# Patient Record
Sex: FEMALE | Race: BLACK OR AFRICAN AMERICAN | Employment: FULL TIME | URBAN - METROPOLITAN AREA
[De-identification: names, ages, dates, MRNs, and addresses within clinical notes are randomized per-mention and may not be internally consistent; named-entity substitution may affect disease eponyms.]

---

## 2018-05-01 ENCOUNTER — OFFICE VISIT (OUTPATIENT)
Dept: FAMILY MEDICINE CLINIC | Age: 63
End: 2018-05-01
Payer: COMMERCIAL

## 2018-05-01 VITALS
BODY MASS INDEX: 32.03 KG/M2 | SYSTOLIC BLOOD PRESSURE: 122 MMHG | HEIGHT: 64 IN | HEART RATE: 89 BPM | DIASTOLIC BLOOD PRESSURE: 79 MMHG | WEIGHT: 187.6 LBS

## 2018-05-01 DIAGNOSIS — Z11.4 SCREENING FOR HIV WITHOUT PRESENCE OF RISK FACTORS: ICD-10-CM

## 2018-05-01 DIAGNOSIS — R07.89 CHEST PRESSURE: ICD-10-CM

## 2018-05-01 DIAGNOSIS — Z13.220 SCREENING FOR HYPERLIPIDEMIA: ICD-10-CM

## 2018-05-01 DIAGNOSIS — Z11.59 NEED FOR HEPATITIS C SCREENING TEST: ICD-10-CM

## 2018-05-01 DIAGNOSIS — Z72.0 TOBACCO ABUSE: ICD-10-CM

## 2018-05-01 DIAGNOSIS — E66.09 CLASS 1 OBESITY DUE TO EXCESS CALORIES WITHOUT SERIOUS COMORBIDITY WITH BODY MASS INDEX (BMI) OF 32.0 TO 32.9 IN ADULT: ICD-10-CM

## 2018-05-01 DIAGNOSIS — Z00.00 PHYSICAL EXAM: Primary | ICD-10-CM

## 2018-05-01 DIAGNOSIS — Z13.1 ENCOUNTER FOR SCREENING FOR DIABETES MELLITUS: ICD-10-CM

## 2018-05-01 PROCEDURE — 99386 PREV VISIT NEW AGE 40-64: CPT | Performed by: PHYSICIAN ASSISTANT

## 2018-05-01 ASSESSMENT — PATIENT HEALTH QUESTIONNAIRE - PHQ9
SUM OF ALL RESPONSES TO PHQ QUESTIONS 1-9: 0
1. LITTLE INTEREST OR PLEASURE IN DOING THINGS: 0
SUM OF ALL RESPONSES TO PHQ9 QUESTIONS 1 & 2: 0
2. FEELING DOWN, DEPRESSED OR HOPELESS: 0

## 2018-05-01 ASSESSMENT — ENCOUNTER SYMPTOMS
SHORTNESS OF BREATH: 0
CONSTIPATION: 1
NAUSEA: 0
COUGH: 0
VOMITING: 0

## 2018-05-11 ENCOUNTER — TELEPHONE (OUTPATIENT)
Dept: FAMILY MEDICINE CLINIC | Age: 63
End: 2018-05-11

## 2018-05-22 ENCOUNTER — HOSPITAL ENCOUNTER (OUTPATIENT)
Age: 63
Discharge: HOME | End: 2018-05-22
Payer: COMMERCIAL

## 2018-05-22 LAB
EKG ATRIAL RATE: 89 BPM
EKG P AXIS: 51 DEGREES
EKG P-R INTERVAL: 172 MS
EKG Q-T INTERVAL: 380 MS
EKG QRS DURATION: 82 MS
EKG QTC CALCULATION (BAZETT): 462 MS
EKG R AXIS: 25 DEGREES
EKG T AXIS: 30 DEGREES
EKG VENTRICULAR RATE: 89 BPM

## 2018-05-22 PROCEDURE — 93005 ELECTROCARDIOGRAM TRACING: CPT

## 2018-06-18 ENCOUNTER — TELEPHONE (OUTPATIENT)
Dept: FAMILY MEDICINE CLINIC | Age: 63
End: 2018-06-18

## 2020-01-22 ENCOUNTER — NURSE TRIAGE (OUTPATIENT)
Dept: OTHER | Facility: CLINIC | Age: 65
End: 2020-01-22

## 2020-02-03 ENCOUNTER — OFFICE VISIT (OUTPATIENT)
Dept: PRIMARY CARE CLINIC | Age: 65
End: 2020-02-03
Payer: COMMERCIAL

## 2020-02-03 VITALS
BODY MASS INDEX: 35.17 KG/M2 | HEART RATE: 74 BPM | WEIGHT: 206 LBS | DIASTOLIC BLOOD PRESSURE: 94 MMHG | SYSTOLIC BLOOD PRESSURE: 139 MMHG | OXYGEN SATURATION: 97 % | TEMPERATURE: 99.1 F | HEIGHT: 64 IN

## 2020-02-03 PROCEDURE — 4004F PT TOBACCO SCREEN RCVD TLK: CPT | Performed by: PHYSICIAN ASSISTANT

## 2020-02-03 PROCEDURE — 99214 OFFICE O/P EST MOD 30 MIN: CPT | Performed by: PHYSICIAN ASSISTANT

## 2020-02-03 PROCEDURE — 3017F COLORECTAL CA SCREEN DOC REV: CPT | Performed by: PHYSICIAN ASSISTANT

## 2020-02-03 PROCEDURE — G8427 DOCREV CUR MEDS BY ELIG CLIN: HCPCS | Performed by: PHYSICIAN ASSISTANT

## 2020-02-03 PROCEDURE — G8417 CALC BMI ABV UP PARAM F/U: HCPCS | Performed by: PHYSICIAN ASSISTANT

## 2020-02-03 PROCEDURE — G8484 FLU IMMUNIZE NO ADMIN: HCPCS | Performed by: PHYSICIAN ASSISTANT

## 2020-02-03 RX ORDER — DICYCLOMINE HYDROCHLORIDE 10 MG/1
10 CAPSULE ORAL 2 TIMES DAILY
Qty: 14 CAPSULE | Refills: 0 | Status: SHIPPED | OUTPATIENT
Start: 2020-02-03 | End: 2020-02-25

## 2020-02-03 ASSESSMENT — PATIENT HEALTH QUESTIONNAIRE - PHQ9
SUM OF ALL RESPONSES TO PHQ QUESTIONS 1-9: 0
SUM OF ALL RESPONSES TO PHQ9 QUESTIONS 1 & 2: 0
1. LITTLE INTEREST OR PLEASURE IN DOING THINGS: 0
2. FEELING DOWN, DEPRESSED OR HOPELESS: 0
SUM OF ALL RESPONSES TO PHQ QUESTIONS 1-9: 0

## 2020-02-03 ASSESSMENT — ENCOUNTER SYMPTOMS
CONSTIPATION: 1
ABDOMINAL PAIN: 1

## 2020-02-03 NOTE — PROGRESS NOTES
[address] [facility] CARE  [facility]  [address] [place] [address]  Dept: [phone]  Dept Fax: [phone]    Office Progress/Follow Up Note    Date of patient's visit: 2/3/2020    Patient's Name:  Adama Wagoner YOB: 1955            Patient Care Team:  Kieran Hutchison PA-C as PCP - General (Physician Assistant)  Kieran Hutchison PA-C as PCP - [facility] Empaneled Provider  ================================================================    REASON FOR VISIT/CHIEF COMPLAINT:  Establish Care and Abdominal Pain (Lower abdomen. Onset was years ago, pain is described as intermittent and becoming more frequently. Blood when wiping after having a BM)    HISTORY OF PRESENTING ILLNESS:  History was obtained from: patient. Adama Wagoner is a 60 y.o. is here to for follow up for abdominal pain. Patient states abdominal pain occurring \" more frequently and lower abdomen\", patient has voiced \"blood on toilet paper\". Patient voiced some abdominal straining with bowel movements due to constipation. Patient denies any colon cancer screening. Discussed abdominal pain, constipation, medications in depth with patient, informed patient she will be referred to GI for further evaluation abdominal pain and colon cancer screening, informed patient she will be prescribed stool softener and Bentyl to take for the next 7 days, instructed patient to contact PCP office to update on symptoms, patient voiced understanding. Patient denies any chronic past medical history. Pt blood pressure is elevated in office. Discussed elevated blood pressure and risk in depth with patient. Patient is obese. Discussed weight loss in depth with patient, encourage patient make changes in diet and the importance of physical activity by exercising multiple times weekly. Patient is a smoker, 1/2 pack daily.  Discussed tobacco cessation and risk in depth with patient, encourage patient to decrease and quit. Labs reviewed, inform patient labs will be ordered and have completed before follow-up appointment, patient voiced understanding. Health maintenance reviewed, patient voiced she had her cervical cancer screening via Pap smear by gynecology in the last 3 years, patient completed CAROLANN and will obtain records, discussed breast cancer screening in depth with patient, mammogram ordered. Medications reviewed, prescribed Colace 50 mg twice daily PRN, Bentyl 10 mg twice daily for 7 days. HPI    Patient Active Problem List   Diagnosis    Class 2 obesity due to excess calories with body mass index (BMI) of 35.0 to 35.9 in adult    Tobacco abuse    Tobacco abuse counseling       Health Maintenance Due   Topic Date Due    Hepatitis C screen  1955    Pneumococcal 0-64 years Vaccine (1 of 1 - PPSV23) 05/29/1963    DTaP/Tdap/Td vaccine (1 - Tdap) 05/29/1968    Cervical cancer screen  05/29/1978    Lipid screen  05/29/1997    Diabetes screen  05/29/1997    Shingles Vaccine (1 of 2) 05/29/2007    Colon cancer screen colonoscopy  05/29/2007    Breast cancer screen  07/21/2018       Allergies   Allergen Reactions    Fish-Derived Products          Current Outpatient Medications   Medication Sig Dispense Refill    docusate sodium (COLACE) 50 MG capsule Take 1 capsule by mouth 2 times daily as needed for Constipation 60 capsule 1    dicyclomine (BENTYL) 10 MG capsule Take 1 capsule by mouth 2 times daily for 7 days 14 capsule 0     No current facility-administered medications for this visit.         Social History     Tobacco Use    Smoking status: Current Every Day Smoker     Packs/day: 0.50     Years: 36.00     Pack years: 18.00     Types: Cigarettes    Smokeless tobacco: Never Used   Substance Use Topics    Alcohol use: Yes     Comment: social    Drug use: No       Family History   Problem Relation Age of Onset    Heart Failure Mother     No Known Problems Father    Bedelia Fruits Examination of the left-upper field reveals decreased breath sounds. Decreased breath sounds present. Abdominal:      Palpations: Abdomen is soft. There is no mass. Tenderness: There is no abdominal tenderness. Skin:     General: Skin is warm and dry. Neurological:      Mental Status: She is alert and oriented to person, place, and time. Psychiatric:         Speech: Speech normal.         Behavior: Behavior normal. Behavior is cooperative. Thought Content: Thought content normal.         Judgment: Judgment normal.           DIAGNOSTIC FINDINGS:  CBC:No results found for: WBC, HGB, PLT    BMP:  No results found for: NA, K, CL, CO2, BUN, CREATININE, GLUCOSE    HEMOGLOBIN A1C: No results found for: LABA1C    FASTING LIPID PANEL:No results found for: CHOL, HDL, TRIG    ASSESSMENT AND PLAN:  Judy Juan was seen today for establish care and abdominal pain. Diagnoses and all orders for this visit:    Lower abdominal pain  -     [facility]  [facility]  -     dicyclomine (BENTYL) 10 MG capsule; Take 1 capsule by mouth 2 times daily for 7 days    Constipation, unspecified constipation type  -     docusate sodium (COLACE) 50 MG capsule; Take 1 capsule by mouth 2 times daily as needed for Constipation  -     [facility], [address]    Elevated blood pressure reading    Class 2 obesity due to excess calories with body mass index (BMI) of 35.0 to 35.9 in adult, unspecified whether serious comorbidity present    Tobacco abuse    Tobacco abuse counseling    Healthcare maintenance  -     CBC Auto Differential; Future  -     Comprehensive Metabolic Panel; Future  -     Lipid Panel; Future    Encounter for screening mammogram for breast cancer  -     [facility] Digital Screen Bilateral [[id number]]; Future      FOLLOW UP AND INSTRUCTIONS:  Return in about 3 months (around 5/3/2020) for Physical, Lab Review, GI problem.     · Ana received counseling on the following healthy

## 2020-02-03 NOTE — PROGRESS NOTES
Visit Information    Have you changed or started any medications since your last visit including any over-the-counter medicines, vitamins, or herbal medicines? no   Are you having any side effects from any of your medications? -  no  Have you stopped taking any of your medications? Is so, why? -  no    Have you seen any other physician or provider since your last visit? Yes, Dermatologist for rash on chest about 2 weeks ago. Have you had any other diagnostic tests since your last visit? No  Have you been seen in the emergency room and/or had an admission to a hospital since we last saw you? No  Have you had your routine dental cleaning in the past 6 months? yes    Have you activated your [employer] account? If not, what are your barriers?  No: Pending     Patient Care Team:  Jolene Lewis PA-C as PCP - General (Physician Assistant)  Jolene Lewis PA-C as PCP - [facility] Provider    Medical History Review  Past Medical, Family, and Social History reviewed and does contribute to the patient presenting condition    Health Maintenance   Topic Date Due    Hepatitis C screen  1955    Pneumococcal 0-64 years Vaccine (1 of 1 - PPSV23) 05/29/1963    DTaP/Tdap/Td vaccine (1 - Tdap) 05/29/1968    HIV screen  05/29/1972    Cervical cancer screen  05/29/1978    Lipid screen  05/29/1997    Diabetes screen  05/29/1997    Shingles Vaccine (1 of 2) 05/29/2007    Colon cancer screen colonoscopy  05/29/2007    Breast cancer screen  07/21/2019    Flu vaccine (1) 09/01/2019

## 2020-02-04 ENCOUNTER — TELEPHONE (OUTPATIENT)
Dept: GASTROENTEROLOGY | Age: 65
End: 2020-02-04

## 2020-02-11 PROBLEM — Z71.6 TOBACCO ABUSE COUNSELING: Status: ACTIVE | Noted: 2020-02-11

## 2020-02-11 ASSESSMENT — ENCOUNTER SYMPTOMS
WHEEZING: 0
NAUSEA: 0
COUGH: 0
BACK PAIN: 0
SHORTNESS OF BREATH: 0
VOMITING: 0
RHINORRHEA: 0
SORE THROAT: 0
DIARRHEA: 0

## 2020-02-21 NOTE — PROGRESS NOTES
reviewed and does contribute to the patient presenting condition. patient\"s PMH/PSH,SH,PSYCH hx, MEDs, ALLERGIES, and ROS was all reviewed and updated ion the appropriate sections    Objective:   Physical Exam  Vitals signs and nursing note reviewed. Constitutional:       Appearance: Normal appearance. She is overweight. HENT:      Head: Normocephalic and atraumatic. Eyes:      Conjunctiva/sclera: Conjunctivae normal.      Pupils: Pupils are equal, round, and reactive to light. Neck:      Musculoskeletal: Normal range of motion and neck supple. Cardiovascular:      Rate and Rhythm: Normal rate and regular rhythm. Heart sounds: Normal heart sounds. Pulmonary:      Effort: Pulmonary effort is normal.      Breath sounds: Normal breath sounds. Abdominal:      General: Bowel sounds are normal. There is no distension. Palpations: Abdomen is soft. Tenderness: There is no abdominal tenderness. There is no guarding. Musculoskeletal: Normal range of motion. Skin:     General: Skin is warm and dry. Neurological:      General: No focal deficit present. Mental Status: She is oriented to person, place, and time. Psychiatric:         Mood and Affect: Mood normal.         Behavior: Behavior normal.         Thought Content: Thought content normal.         Judgment: Judgment normal.         Assessment:       Diagnosis Orders   1. Screen for colon cancer  COLONOSCOPY W/ OR W/O BIOPSY           Plan:      Presently abdominal pain appears to be vague. She has not had repeat pain since early February. Has never had colonoscopy screening.       The Endoscopic procedure was explained to the patient in detail  The prep and NPO were explained  All the Risks, Benefits, and Alternatives were explained  Risk of Bleeding, Perforation and Cardio Respiratory risks were explained  her questions were answered  The procedure has been scheduled with the  in the office  Patient was asked to give

## 2020-02-25 ENCOUNTER — OFFICE VISIT (OUTPATIENT)
Dept: GASTROENTEROLOGY | Age: 65
End: 2020-02-25
Payer: COMMERCIAL

## 2020-02-25 VITALS
OXYGEN SATURATION: 97 % | SYSTOLIC BLOOD PRESSURE: 141 MMHG | BODY MASS INDEX: 35.02 KG/M2 | WEIGHT: 204 LBS | HEART RATE: 92 BPM | DIASTOLIC BLOOD PRESSURE: 94 MMHG

## 2020-02-25 PROCEDURE — 99244 OFF/OP CNSLTJ NEW/EST MOD 40: CPT | Performed by: INTERNAL MEDICINE

## 2020-02-25 PROCEDURE — G8427 DOCREV CUR MEDS BY ELIG CLIN: HCPCS | Performed by: INTERNAL MEDICINE

## 2020-02-25 PROCEDURE — G8417 CALC BMI ABV UP PARAM F/U: HCPCS | Performed by: INTERNAL MEDICINE

## 2020-02-25 PROCEDURE — G8484 FLU IMMUNIZE NO ADMIN: HCPCS | Performed by: INTERNAL MEDICINE

## 2020-02-25 RX ORDER — SODIUM, POTASSIUM,MAG SULFATES 17.5-3.13G
SOLUTION, RECONSTITUTED, ORAL ORAL
Qty: 1 BOTTLE | Refills: 0 | Status: SHIPPED | OUTPATIENT
Start: 2020-02-25 | End: 2020-04-14 | Stop reason: ALTCHOICE

## 2020-02-25 ASSESSMENT — ENCOUNTER SYMPTOMS
TROUBLE SWALLOWING: 0
SHORTNESS OF BREATH: 0
DIARRHEA: 0
CONSTIPATION: 1
VOICE CHANGE: 0
WHEEZING: 0
BLOOD IN STOOL: 1
BACK PAIN: 0
NAUSEA: 0
COUGH: 0
ABDOMINAL PAIN: 1
CHOKING: 0
SORE THROAT: 0

## 2020-04-01 ENCOUNTER — TELEPHONE (OUTPATIENT)
Dept: GASTROENTEROLOGY | Age: 65
End: 2020-04-01

## 2020-04-02 ENCOUNTER — HOSPITAL ENCOUNTER (OUTPATIENT)
Age: 65
Discharge: HOME | End: 2020-04-02
Payer: COMMERCIAL

## 2020-04-02 LAB
ABSOLUTE EOS #: 0.18 K/UL (ref 0–0.44)
ABSOLUTE IMMATURE GRANULOCYTE: <0.03 K/UL (ref 0–0.3)
ABSOLUTE LYMPH #: 2.75 K/UL (ref 1.1–3.7)
ABSOLUTE MONO #: 0.55 K/UL (ref 0.1–1.2)
ALBUMIN SERPL-MCNC: 3.9 G/DL (ref 3.5–5.2)
ALBUMIN/GLOBULIN RATIO: 1.2 (ref 1–2.5)
ALP BLD-CCNC: 93 U/L (ref 35–104)
ALT SERPL-CCNC: 20 U/L (ref 5–33)
ANION GAP SERPL CALCULATED.3IONS-SCNC: 9 MMOL/L (ref 9–17)
AST SERPL-CCNC: 17 U/L
BASOPHILS # BLD: 0 % (ref 0–2)
BASOPHILS ABSOLUTE: <0.03 K/UL (ref 0–0.2)
BILIRUB SERPL-MCNC: 0.27 MG/DL (ref 0.3–1.2)
BUN BLDV-MCNC: 10 MG/DL (ref 8–23)
BUN/CREAT BLD: ABNORMAL (ref 9–20)
CALCIUM SERPL-MCNC: 9.1 MG/DL (ref 8.6–10.4)
CHLORIDE BLD-SCNC: 100 MMOL/L (ref 98–107)
CHOLESTEROL/HDL RATIO: 5.3
CHOLESTEROL: 255 MG/DL
CO2: 26 MMOL/L (ref 20–31)
CREAT SERPL-MCNC: 0.75 MG/DL (ref 0.5–0.9)
DIFFERENTIAL TYPE: ABNORMAL
EOSINOPHILS RELATIVE PERCENT: 3 % (ref 1–4)
GFR AFRICAN AMERICAN: >60 ML/MIN
GFR NON-AFRICAN AMERICAN: >60 ML/MIN
GFR SERPL CREATININE-BSD FRML MDRD: ABNORMAL ML/MIN/{1.73_M2}
GFR SERPL CREATININE-BSD FRML MDRD: ABNORMAL ML/MIN/{1.73_M2}
GLUCOSE BLD-MCNC: 96 MG/DL (ref 70–99)
HCT VFR BLD CALC: 42 % (ref 36.3–47.1)
HDLC SERPL-MCNC: 48 MG/DL
HEMOGLOBIN: 13.3 G/DL (ref 11.9–15.1)
IMMATURE GRANULOCYTES: 0 %
LDL CHOLESTEROL: 164 MG/DL (ref 0–130)
LYMPHOCYTES # BLD: 41 % (ref 24–43)
MCH RBC QN AUTO: 22.7 PG (ref 25.2–33.5)
MCHC RBC AUTO-ENTMCNC: 31.7 G/DL (ref 28.4–34.8)
MCV RBC AUTO: 71.8 FL (ref 82.6–102.9)
MONOCYTES # BLD: 8 % (ref 3–12)
NRBC AUTOMATED: 0 PER 100 WBC
PDW BLD-RTO: 16.5 % (ref 11.8–14.4)
PLATELET # BLD: 332 K/UL (ref 138–453)
PLATELET ESTIMATE: ABNORMAL
PMV BLD AUTO: 10.5 FL (ref 8.1–13.5)
POTASSIUM SERPL-SCNC: 4.1 MMOL/L (ref 3.7–5.3)
RBC # BLD: 5.85 M/UL (ref 3.95–5.11)
RBC # BLD: ABNORMAL 10*6/UL
SEG NEUTROPHILS: 48 % (ref 36–65)
SEGMENTED NEUTROPHILS ABSOLUTE COUNT: 3.19 K/UL (ref 1.5–8.1)
SODIUM BLD-SCNC: 135 MMOL/L (ref 135–144)
TOTAL PROTEIN: 7.2 G/DL (ref 6.4–8.3)
TRIGL SERPL-MCNC: 213 MG/DL
VLDLC SERPL CALC-MCNC: ABNORMAL MG/DL (ref 1–30)
WBC # BLD: 6.7 K/UL (ref 3.5–11.3)
WBC # BLD: ABNORMAL 10*3/UL

## 2020-04-02 PROCEDURE — 80053 COMPREHEN METABOLIC PANEL: CPT

## 2020-04-02 PROCEDURE — 80061 LIPID PANEL: CPT

## 2020-04-02 PROCEDURE — 36415 COLL VENOUS BLD VENIPUNCTURE: CPT

## 2020-04-02 PROCEDURE — 85025 COMPLETE CBC W/AUTO DIFF WBC: CPT

## 2020-04-05 ENCOUNTER — TELEPHONE (OUTPATIENT)
Dept: PRIMARY CARE CLINIC | Age: 65
End: 2020-04-05

## 2020-04-09 NOTE — TELEPHONE ENCOUNTER
Patient called back regarding test results. Patient states she did fast and was told before that is had low iron. Patient states she keeps breaking out around chest. Patient would like to keep her appointment in May. Please review and advise.

## 2020-04-13 ENCOUNTER — TELEPHONE (OUTPATIENT)
Dept: GASTROENTEROLOGY | Age: 65
End: 2020-04-13

## 2020-04-14 ENCOUNTER — VIRTUAL VISIT (OUTPATIENT)
Dept: PRIMARY CARE CLINIC | Age: 65
End: 2020-04-14
Payer: COMMERCIAL

## 2020-04-14 VITALS — BODY MASS INDEX: 34.33 KG/M2 | WEIGHT: 200 LBS

## 2020-04-14 PROCEDURE — G8427 DOCREV CUR MEDS BY ELIG CLIN: HCPCS | Performed by: PHYSICIAN ASSISTANT

## 2020-04-14 PROCEDURE — 3017F COLORECTAL CA SCREEN DOC REV: CPT | Performed by: PHYSICIAN ASSISTANT

## 2020-04-14 PROCEDURE — 99213 OFFICE O/P EST LOW 20 MIN: CPT | Performed by: PHYSICIAN ASSISTANT

## 2020-04-14 RX ORDER — CLOTRIMAZOLE AND BETAMETHASONE DIPROPIONATE 10; .64 MG/G; MG/G
CREAM TOPICAL
Qty: 1 TUBE | Refills: 1 | Status: SHIPPED | OUTPATIENT
Start: 2020-04-14 | End: 2020-08-19

## 2020-04-14 RX ORDER — MUPIROCIN CALCIUM 20 MG/G
CREAM TOPICAL
Qty: 1 TUBE | Refills: 1 | Status: SHIPPED | OUTPATIENT
Start: 2020-04-14 | End: 2020-04-18

## 2020-04-17 ENCOUNTER — TELEPHONE (OUTPATIENT)
Dept: PRIMARY CARE CLINIC | Age: 65
End: 2020-04-17

## 2020-04-18 ASSESSMENT — ENCOUNTER SYMPTOMS
BACK PAIN: 0
WHEEZING: 0
NAUSEA: 0
COUGH: 0
CONSTIPATION: 0
VOMITING: 0
DIARRHEA: 0
SHORTNESS OF BREATH: 0

## 2020-04-18 NOTE — PROGRESS NOTES
patient, informed patient she will be prescribed antibiotic and steroid cream to apply to affected area as needed, instructed patient to contact PCP office in 10 days if symptoms have not improved and resolve for possible dermatology referral, patient voiced understanding. Labs reviewed. Health maintenance reviewed. Medications reviewed, prescribed Bactroban 2% ointment applying 1 g daily as needed, Lotrisone 1-0.05% cream applying topically daily PRN. HPI    Review of Systems   Constitutional: Negative for chills and fever. HENT: Negative for congestion. Respiratory: Negative for cough, shortness of breath and wheezing. Cardiovascular: Negative for chest pain. Gastrointestinal: Negative for constipation, diarrhea, nausea and vomiting. Genitourinary: Negative for dysuria, frequency and urgency. Musculoskeletal: Negative for back pain, myalgias and neck pain. Skin: Positive for rash. Neurological: Negative for headaches. Prior to Visit Medications    Medication Sig Taking? Authorizing Provider   clotrimazole-betamethasone (LOTRISONE) 1-0.05 % cream Apply topically daily as needed Yes Siddhartha Logan PA-C   mupirocin (BACTROBAN) 2 % ointment Apply 1 g daily as needed. Siddhartha Logan PA-C       Social History     Tobacco Use    Smoking status: Current Every Day Smoker     Packs/day: 0.50     Years: 36.00     Pack years: 18.00     Types: Cigarettes    Smokeless tobacco: Never Used   Substance Use Topics    Alcohol use: Yes     Comment: social    Drug use: No        Past Medical History:   Diagnosis Date    Class 1 obesity due to excess calories without serious comorbidity with body mass index (BMI) of 32.0 to 32.9 in adult 5/1/2018    Tobacco abuse 5/1/2018              RECORD REVIEW: Previous medical records were reviewed at today's visit.     PHYSICAL EXAMINATION:    Vital Signs: (As obtained by patient/caregiver at home)  There were no vitals filed for this Apply 1 g daily as needed    Rash  -     clotrimazole-betamethasone (LOTRISONE) 1-0.05 % cream; Apply topically daily as needed        Return if symptoms worsen or fail to improve. An  electronic signature was used to authenticate this note. --Aster Jimenez PA-C on 4/18/2020 at 7:21 PM    9}    Pursuant to the emergency declaration under the [address] [place] [address], [facility] waiver authority and the [employer] Act, this Virtual  Visit was conducted, with patient's consent, to reduce the patient's risk of exposure to COVID-19 and provide continuity of care for an established patient. Services were provided through a video synchronous discussion virtually to substitute for in-person clinic visit.

## 2020-06-15 ENCOUNTER — TELEPHONE (OUTPATIENT)
Dept: GASTROENTEROLOGY | Age: 65
End: 2020-06-15

## 2020-06-16 ENCOUNTER — HOSPITAL ENCOUNTER (OUTPATIENT)
Dept: PREADMISSION TESTING | Age: 65
Setting detail: SPECIMEN
Discharge: HOME | End: 2020-06-20
Payer: COMMERCIAL

## 2020-06-16 PROCEDURE — U0003 INFECTIOUS AGENT DETECTION BY NUCLEIC ACID (DNA OR RNA); SEVERE ACUTE RESPIRATORY SYNDROME CORONAVIRUS 2 (SARS-COV-2) (CORONAVIRUS DISEASE [COVID-19]), AMPLIFIED PROBE TECHNIQUE, MAKING USE OF HIGH THROUGHPUT TECHNOLOGIES AS DESCRIBED BY CMS-2020-01-R: HCPCS

## 2020-06-17 LAB
SARS-COV-2, PCR: NORMAL
SARS-COV-2, RAPID: NORMAL
SARS-COV-2: NOT DETECTED
SOURCE: NORMAL

## 2020-06-18 ENCOUNTER — TELEPHONE (OUTPATIENT)
Dept: PRIMARY CARE CLINIC | Age: 65
End: 2020-06-18

## 2020-06-19 ENCOUNTER — TELEPHONE (OUTPATIENT)
Dept: GASTROENTEROLOGY | Age: 65
End: 2020-06-19

## 2020-07-14 ENCOUNTER — HOSPITAL ENCOUNTER (OUTPATIENT)
Dept: PREADMISSION TESTING | Age: 65
Setting detail: SPECIMEN
Discharge: HOME | End: 2020-07-18
Payer: COMMERCIAL

## 2020-07-14 PROCEDURE — U0004 COV-19 TEST NON-CDC HGH THRU: HCPCS

## 2020-07-15 LAB
SARS-COV-2, PCR: NOT DETECTED
SARS-COV-2, RAPID: NORMAL
SARS-COV-2: NORMAL
SOURCE: NORMAL

## 2020-07-16 ENCOUNTER — TELEPHONE (OUTPATIENT)
Dept: FAMILY MEDICINE CLINIC | Age: 65
End: 2020-07-16

## 2020-07-16 ENCOUNTER — TELEPHONE (OUTPATIENT)
Dept: GASTROENTEROLOGY | Age: 65
End: 2020-07-16

## 2020-07-16 NOTE — TELEPHONE ENCOUNTER
Denny Dupont to ensure she has good understanding of CLD and bowel preparation needed for tomorrow, 7/17/20 for colonoscopy scheduled on 7/18/20 at [facility] with Dr. Juan Reid. Writer reviewed all instructions with Enrico Caruso, she expressed understanding and stated that she has her bowel prep at home and has transportation.

## 2020-07-18 ENCOUNTER — ANESTHESIA (OUTPATIENT)
Dept: ENDOSCOPY | Age: 65
End: 2020-07-18
Payer: COMMERCIAL

## 2020-07-18 ENCOUNTER — ANESTHESIA EVENT (OUTPATIENT)
Dept: ENDOSCOPY | Age: 65
End: 2020-07-18
Payer: COMMERCIAL

## 2020-07-18 ENCOUNTER — HOSPITAL ENCOUNTER (OUTPATIENT)
Age: 65
Setting detail: OUTPATIENT SURGERY
Discharge: HOME | End: 2020-07-18
Attending: INTERNAL MEDICINE | Admitting: INTERNAL MEDICINE
Payer: COMMERCIAL

## 2020-07-18 VITALS
HEIGHT: 64 IN | DIASTOLIC BLOOD PRESSURE: 93 MMHG | RESPIRATION RATE: 10 BRPM | BODY MASS INDEX: 33.12 KG/M2 | HEART RATE: 61 BPM | WEIGHT: 194 LBS | OXYGEN SATURATION: 97 % | SYSTOLIC BLOOD PRESSURE: 136 MMHG | TEMPERATURE: 97.1 F

## 2020-07-18 VITALS — DIASTOLIC BLOOD PRESSURE: 76 MMHG | SYSTOLIC BLOOD PRESSURE: 115 MMHG | OXYGEN SATURATION: 100 %

## 2020-07-18 PROCEDURE — 7100000000 HC PACU RECOVERY - FIRST 15 MIN: Performed by: INTERNAL MEDICINE

## 2020-07-18 PROCEDURE — 7100000001 HC PACU RECOVERY - ADDTL 15 MIN: Performed by: INTERNAL MEDICINE

## 2020-07-18 PROCEDURE — 2709999900 HC NON-CHARGEABLE SUPPLY: Performed by: INTERNAL MEDICINE

## 2020-07-18 PROCEDURE — 6360000002 HC RX W HCPCS

## 2020-07-18 PROCEDURE — 45385 COLONOSCOPY W/LESION REMOVAL: CPT | Performed by: INTERNAL MEDICINE

## 2020-07-18 PROCEDURE — 3700000001 HC ADD 15 MINUTES (ANESTHESIA): Performed by: INTERNAL MEDICINE

## 2020-07-18 PROCEDURE — 3700000000 HC ANESTHESIA ATTENDED CARE: Performed by: INTERNAL MEDICINE

## 2020-07-18 PROCEDURE — 88305 TISSUE EXAM BY PATHOLOGIST: CPT

## 2020-07-18 PROCEDURE — 2500000003 HC RX 250 WO HCPCS

## 2020-07-18 PROCEDURE — 3609010400 HC COLONOSCOPY POLYPECTOMY HOT BIOPSY: Performed by: INTERNAL MEDICINE

## 2020-07-18 PROCEDURE — 2580000003 HC RX 258: Performed by: INTERNAL MEDICINE

## 2020-07-18 RX ORDER — SODIUM CHLORIDE, SODIUM LACTATE, POTASSIUM CHLORIDE, CALCIUM CHLORIDE 600; 310; 30; 20 MG/100ML; MG/100ML; MG/100ML; MG/100ML
INJECTION, SOLUTION INTRAVENOUS CONTINUOUS
Status: DISCONTINUED | OUTPATIENT
Start: 2020-07-18 | End: 2020-07-18 | Stop reason: HOSPADM

## 2020-07-18 RX ORDER — ONDANSETRON 2 MG/ML
4 INJECTION INTRAMUSCULAR; INTRAVENOUS
Status: DISCONTINUED | OUTPATIENT
Start: 2020-07-18 | End: 2020-07-18 | Stop reason: HOSPADM

## 2020-07-18 RX ORDER — LABETALOL 20 MG/4 ML (5 MG/ML) INTRAVENOUS SYRINGE
5 EVERY 10 MIN PRN
Status: DISCONTINUED | OUTPATIENT
Start: 2020-07-18 | End: 2020-07-18 | Stop reason: HOSPADM

## 2020-07-18 RX ORDER — DIPHENHYDRAMINE HYDROCHLORIDE 50 MG/ML
12.5 INJECTION INTRAMUSCULAR; INTRAVENOUS
Status: DISCONTINUED | OUTPATIENT
Start: 2020-07-18 | End: 2020-07-18 | Stop reason: HOSPADM

## 2020-07-18 RX ORDER — PROPOFOL 10 MG/ML
INJECTION, EMULSION INTRAVENOUS PRN
Status: DISCONTINUED | OUTPATIENT
Start: 2020-07-18 | End: 2020-07-18 | Stop reason: SDUPTHER

## 2020-07-18 RX ORDER — MORPHINE SULFATE 2 MG/ML
2 INJECTION, SOLUTION INTRAMUSCULAR; INTRAVENOUS EVERY 5 MIN PRN
Status: DISCONTINUED | OUTPATIENT
Start: 2020-07-18 | End: 2020-07-18 | Stop reason: HOSPADM

## 2020-07-18 RX ORDER — MEPERIDINE HYDROCHLORIDE 25 MG/ML
12.5 INJECTION INTRAMUSCULAR; INTRAVENOUS; SUBCUTANEOUS EVERY 5 MIN PRN
Status: DISCONTINUED | OUTPATIENT
Start: 2020-07-18 | End: 2020-07-18 | Stop reason: HOSPADM

## 2020-07-18 RX ORDER — LIDOCAINE HYDROCHLORIDE 10 MG/ML
INJECTION, SOLUTION EPIDURAL; INFILTRATION; INTRACAUDAL; PERINEURAL PRN
Status: DISCONTINUED | OUTPATIENT
Start: 2020-07-18 | End: 2020-07-18 | Stop reason: SDUPTHER

## 2020-07-18 RX ADMIN — PROPOFOL 30 MG: 10 INJECTION, EMULSION INTRAVENOUS at 08:15

## 2020-07-18 RX ADMIN — PROPOFOL 10 MG: 10 INJECTION, EMULSION INTRAVENOUS at 08:28

## 2020-07-18 RX ADMIN — PROPOFOL 20 MG: 10 INJECTION, EMULSION INTRAVENOUS at 08:26

## 2020-07-18 RX ADMIN — PROPOFOL 30 MG: 10 INJECTION, EMULSION INTRAVENOUS at 08:32

## 2020-07-18 RX ADMIN — PROPOFOL 20 MG: 10 INJECTION, EMULSION INTRAVENOUS at 08:20

## 2020-07-18 RX ADMIN — PROPOFOL 20 MG: 10 INJECTION, EMULSION INTRAVENOUS at 08:22

## 2020-07-18 RX ADMIN — PROPOFOL 30 MG: 10 INJECTION, EMULSION INTRAVENOUS at 08:21

## 2020-07-18 RX ADMIN — PROPOFOL 20 MG: 10 INJECTION, EMULSION INTRAVENOUS at 08:17

## 2020-07-18 RX ADMIN — PROPOFOL 30 MG: 10 INJECTION, EMULSION INTRAVENOUS at 08:16

## 2020-07-18 RX ADMIN — PROPOFOL 20 MG: 10 INJECTION, EMULSION INTRAVENOUS at 08:14

## 2020-07-18 RX ADMIN — PROPOFOL 20 MG: 10 INJECTION, EMULSION INTRAVENOUS at 08:19

## 2020-07-18 RX ADMIN — PROPOFOL 50 MG: 10 INJECTION, EMULSION INTRAVENOUS at 08:13

## 2020-07-18 RX ADMIN — PROPOFOL 20 MG: 10 INJECTION, EMULSION INTRAVENOUS at 08:31

## 2020-07-18 RX ADMIN — PROPOFOL 30 MG: 10 INJECTION, EMULSION INTRAVENOUS at 08:23

## 2020-07-18 RX ADMIN — PROPOFOL 20 MG: 10 INJECTION, EMULSION INTRAVENOUS at 08:24

## 2020-07-18 RX ADMIN — LIDOCAINE HYDROCHLORIDE 50 MG: 10 INJECTION, SOLUTION EPIDURAL; INFILTRATION; INTRACAUDAL; PERINEURAL at 08:13

## 2020-07-18 RX ADMIN — PROPOFOL 30 MG: 10 INJECTION, EMULSION INTRAVENOUS at 08:18

## 2020-07-18 RX ADMIN — SODIUM CHLORIDE, POTASSIUM CHLORIDE, SODIUM LACTATE AND CALCIUM CHLORIDE: 600; 310; 30; 20 INJECTION, SOLUTION INTRAVENOUS at 06:31

## 2020-07-18 ASSESSMENT — PULMONARY FUNCTION TESTS
PIF_VALUE: 1
PIF_VALUE: 0
PIF_VALUE: 1
PIF_VALUE: 0
PIF_VALUE: 1

## 2020-07-18 ASSESSMENT — LIFESTYLE VARIABLES: SMOKING_STATUS: 0

## 2020-07-18 ASSESSMENT — PAIN DESCRIPTION - LOCATION: LOCATION: ABDOMEN

## 2020-07-18 ASSESSMENT — ENCOUNTER SYMPTOMS
SHORTNESS OF BREATH: 0
STRIDOR: 0

## 2020-07-18 ASSESSMENT — PAIN DESCRIPTION - PAIN TYPE: TYPE: ACUTE PAIN

## 2020-07-18 ASSESSMENT — PAIN SCALES - GENERAL: PAINLEVEL_OUTOF10: 0

## 2020-07-18 ASSESSMENT — PAIN - FUNCTIONAL ASSESSMENT: PAIN_FUNCTIONAL_ASSESSMENT: 0-10

## 2020-07-18 ASSESSMENT — PAIN DESCRIPTION - DESCRIPTORS: DESCRIPTORS: CRAMPING

## 2020-07-18 NOTE — ANESTHESIA POSTPROCEDURE EVALUATION
POST- ANESTHESIA EVALUATION       Pt Name: Daryl Ordonez  MRN: [id number]  YOB: 1955  Date of evaluation: 7/18/2020  Time:  9:33 AM      BP (!) 136/93   Pulse 61   Temp 97.1 °F (36.2 °C) (Infrared)   Resp 10   Ht 5' 4\" (1.626 m)   Wt 194 lb (88 kg)   SpO2 96%   BMI 33.30 kg/m²      Consciousness Level  Awake  Cardiopulmonary Status  Stable  Pain Adequately Treated YES  Nausea / Vomiting  NO  Adequate Hydration  YES  Anesthesia Related Complications NONE      Electronically signed by Nydia Vizcaino MD on 7/18/2020 at 9:33 AM       Department of Anesthesiology  Postprocedure Note    Patient: Daryl Ordonez  MRN: [id number]  YOB: 1955  Date of evaluation: 7/18/2020  Time:  9:32 AM     Procedure Summary     Date:  07/18/20 Room / Location:  [address] [place] [address] 04 / 250 [facility] ENDO    Anesthesia Start:  9229 Anesthesia Stop:  3685    Procedure:  COLONOSCOPY POLYPECTOMY HOT SNARE (N/A ) Diagnosis:  (SCREENING            PAT ON ADMIT PER ANES)    Surgeon:  Emelyn Quarles MD Responsible Provider:  yNdia Vizcaino MD    Anesthesia Type:  MAC, general ASA Status:  2          Anesthesia Type: MAC, general    Nathalia Phase I: Nathalia Score: 8    Nathalia Phase II:      Last vitals: Reviewed and per EMR flowsheets.        Anesthesia Post Evaluation

## 2020-07-18 NOTE — H&P
HISTORY and [address]       NAME:  Deyvi Bhandari  MRN: [id number]   YOB: 1955   Date: 7/18/2020   Age: 63 y.o. Gender: female       COMPLAINT AND PRESENT HISTORY:     Deyvi Bhandari is 63 y.o.,   female, having a screening colonoscopy. Pt reports intermittent LLQ and RLQ abdominal pain for last year. Pt reports persistent bloating not related to eating and despite having BM. Not relieved with diet modification. Denies any nausea or vomiting. Pt reports chronic constipation. Takes metamucil with moderate relief. Pt reports blood in stool in January. Pt reports drops of blood in toilet and on toilet paper after wiping. Denies any melena. Denies Family Hx of cancer colon. Completed prep. NPO. Did not take any medications this am. No blood thinners in 7 days. Denies any current chest pain/pressure, palpitations, SOB, recent URI, fever or chills.      PAST MEDICAL HISTORY     Past Medical History:   Diagnosis Date    Class 1 obesity due to excess calories without serious comorbidity with body mass index (BMI) of 32.0 to 32.9 in adult 5/1/2018    Tobacco abuse 5/1/2018       SURGICAL HISTORY       Past Surgical History:   Procedure Laterality Date    PARTIAL HYSTERECTOMY         FAMILY HISTORY       Family History   Problem Relation Age of Onset    Heart Failure Mother     No Known Problems Father     Heart Failure Maternal Grandmother     Heart Failure Other     Heart Failure Maternal Aunt     Heart Failure Maternal Cousin        SOCIAL HISTORY       Social History     Socioeconomic History    Marital status: Single     Spouse name: None    Number of children: None    Years of education: None    Highest education level: None   Occupational History    None   Social Needs    Financial resource strain: None    Food insecurity     Worry: None     Inability: None    Transportation needs     Medical: None     Non-medical: None   Tobacco Use    Smoking status: Current Every Day Smoker     Packs/day: 0.50     Years: 36.00     Pack years: 18.00     Types: Cigarettes    Smokeless tobacco: Never Used   Substance and Sexual Activity    Alcohol use: Yes     Comment: social    Drug use: No    Sexual activity: None   Lifestyle    Physical activity     Days per week: None     Minutes per session: None    Stress: None   Relationships    Social connections     Talks on phone: None     Gets together: None     Attends L[employer] service: None     Active member of club or organization: None     Attends meetings of clubs or organizations: None     Relationship status: None    Intimate partner violence     Fear of current or ex partner: None     Emotionally abused: None     Physically abused: None     Forced sexual activity: None   Other Topics Concern    None   Social History Narrative    None        REVIEW OF SYSTEMS      Allergies   Allergen Reactions    Fish-Derived Products        No current facility-administered medications on file prior to encounter. Current Outpatient Medications on File Prior to Encounter   Medication Sig Dispense Refill    clotrimazole-betamethasone (LOTRISONE) 1-0.05 % cream Apply topically daily as needed 1 Tube 1     Negative except for what is mentioned in the HPI. GENERAL PHYSICAL EXAM     Vitals: /69   Pulse 85   Temp 97.5 °F (36.4 °C) (Infrared)   Resp 16   Ht 5' 4\" (1.626 m)   Wt 194 lb (88 kg)   SpO2 97%   BMI 33.30 kg/m²  Body mass index is 33.3 kg/m². GENERAL APPEARANCE:   Dixon Mireles is 63 y.o.,  female, moderately obese, nourished, conscious, alert. Does not appear to be in any distress or pain at this time. SKIN:  Warm, dry, no cyanosis or jaundice. HEAD:  Normocephalic, atraumatic. EYES:  Pupils equal, reactive to light. EARS:  No discharge, no marked hearing loss.                NOSE:  No rhinorrhea, epistaxis or septal deformity. THROAT:  Mucus membranes moist, no erythema or exudate. NECK:  No stiffness, trachea central.  No palpable masses or L.N.                 CHEST:  Symmetrical and equal on expansion. HEART:  RRR S1 > S2. No audible murmurs or gallops. LUNGS:  Equal on expansion, normal breath sounds. No wheezing, rhonchi or rales. ABDOMEN:  Obese. NABS x 4 quads. Soft on palpation. No localized tenderness. No guarding or rigidity. No palpable hepatosplenomegaly. LYMPHATICS:  No palpable cervical lymphadenopathy. LOCOMOTOR, BACK AND SPINE:  No tenderness or deformities. EXTREMITIES:  Symmetrical, no pretibial edema. No calf tenderness. No discoloration or ulcerations. NEUROLOGIC:  The patient is conscious, alert, oriented. Speech clear. No facial drooping. No apparent focal sensory or motor deficits.              PROVISIONAL DIAGNOSES / SURGERY:      SCREENING      COLORECTAL CANCER SCREENING, NOT HIGH RISK    Patient Active Problem List    Diagnosis Date Noted    Tobacco abuse counseling 02/11/2020    Class 2 obesity due to excess calories with body mass index (BMI) of 35.0 to 35.9 in adult 05/01/2018    Tobacco abuse 05/01/2018           KIKA Blankenship CNP on 7/18/2020 at 6:46 AM

## 2020-07-18 NOTE — ANESTHESIA PRE PROCEDURE
Department of Anesthesiology  Preprocedure Note       Name:  Maria L Angulo   Age:  63 y.o.  :  1955                                          MRN:  [id number]         Date:  2020      Surgeon: Narciso Norwood):  Enriqueta Cosme MD    Procedure: Procedure(s):  COLORECTAL CANCER SCREENING, NOT HIGH RISK    Medications prior to admission:   Prior to Admission medications    Medication Sig Start Date End Date Taking? Authorizing Provider   clotrimazole-betamethasone (Elverna Sly) 1-0.05 % cream Apply topically daily as needed 20  Yes Zunilda Zuleta PA-C       Current medications:    Current Facility-Administered Medications   Medication Dose Route Frequency Provider Last Rate Last Dose    lactated ringers infusion   Intravenous Continuous Enriqueta Cosme  mL/hr at 20 0631         Allergies:     Allergies   Allergen Reactions    Fish-Derived Products        Problem List:    Patient Active Problem List   Diagnosis Code    Class 2 obesity due to excess calories with body mass index (BMI) of 35.0 to 35.9 in adult E66.09, Z68.35    Tobacco abuse Z72.0    Tobacco abuse counseling Z71.6       Past Medical History:        Diagnosis Date    Class 1 obesity due to excess calories without serious comorbidity with body mass index (BMI) of 32.0 to 32.9 in adult 2018    Tobacco abuse 2018       Past Surgical History:        Procedure Laterality Date    PARTIAL HYSTERECTOMY         Social History:    Social History     Tobacco Use    Smoking status: Current Every Day Smoker     Packs/day: 0.50     Years: 36.00     Pack years: 18.00     Types: Cigarettes    Smokeless tobacco: Never Used   Substance Use Topics    Alcohol use: Yes     Comment: social                                Ready to quit: Not Answered  Counseling given: Not Answered      Vital Signs (Current):   Vitals:    20 0620   BP: 122/69   Pulse: 85   Resp: 16   Temp: 97.5 °F (36.4 °C)   TempSrc: Infrared   SpO2: 97% Weight: 194 lb (88 kg)   Height: 5' 4\" (1.626 m)                                              BP Readings from Last 3 Encounters:   07/18/20 122/69   02/25/20 (!) 141/94   02/03/20 (!) 139/94       NPO Status: Time of last liquid consumption: 0400(4 0z black coffee)                        Time of last solid consumption: 1800                        Date of last liquid consumption: 07/18/20                        Date of last solid food consumption: 07/16/20    BMI:   Wt Readings from Last 3 Encounters:   07/18/20 194 lb (88 kg)   04/14/20 200 lb (90.7 kg)   02/25/20 204 lb (92.5 kg)     Body mass index is 33.3 kg/m². CBC:   Lab Results   Component Value Date    WBC 6.7 04/02/2020    RBC 5.85 04/02/2020    HGB 13.3 04/02/2020    HCT 42.0 04/02/2020    MCV 71.8 04/02/2020    RDW 16.5 04/02/2020     04/02/2020     K 4.1    CMP:   Lab Results   Component Value Date     04/02/2020    K 4.1 04/02/2020     04/02/2020    CO2 26 04/02/2020    BUN 10 04/02/2020    CREATININE 0.75 04/02/2020    GFRAA >60 04/02/2020    LABGLOM >60 04/02/2020    GLUCOSE 96 04/02/2020    PROT 7.2 04/02/2020    CALCIUM 9.1 04/02/2020    BILITOT 0.27 04/02/2020    ALKPHOS 93 04/02/2020    AST 17 04/02/2020    ALT 20 04/02/2020       POC Tests: No results for input(s): POCGLU, POCNA, POCK, POCCL, POCBUN, POCHEMO, POCHCT in the last 72 hours.     Coags: No results found for: PROTIME, INR, APTT    HCG (If Applicable): No results found for: PREGTESTUR, PREGSERUM, HCG, HCGQUANT     ABGs: No results found for: PHART, PO2ART, YQS5EEG, ZNI0FPV, BEART, N1ZYKEDW     Type & Screen (If Applicable):  No results found for: LABABO, LABRH    Drug/Infectious Status (If Applicable):  No results found for: HIV, HEPCAB    COVID-19 Screening (If Applicable):   Lab Results   Component Value Date    COVID19 Not Detected 07/14/2020         Anesthesia Evaluation  Patient summary reviewed  Airway: Mallampati: II  TM distance: >3 FB   Neck ROM: full  Mouth opening: > = 3 FB Dental: normal exam         Pulmonary:normal exam  breath sounds clear to auscultation      (-) pneumonia, COPD, asthma, shortness of breath, recent URI, sleep apnea, rhonchi, wheezes, rales, stridor and not a current smoker          Patient smoked on day of surgery. Cardiovascular:  Exercise tolerance: good (>4 METS),       (-) pacemaker, hypertension, valvular problems/murmurs, past MI, CAD, CABG/stent, dysrhythmias,  angina,  CHF, orthopnea, PND,  HAUSER, murmur, weak pulses,  friction rub, systolic click, carotid bruit,  JVD and peripheral edema      Rhythm: regular  Rate: normal           Beta Blocker:  Not on Beta Blocker         Neuro/Psych:   Negative Neuro/Psych ROS     (-) seizures, neuromuscular disease, TIA, CVA, headaches, psychiatric history and depression/anxiety            GI/Hepatic/Renal: Neg GI/Hepatic/Renal ROS       (-) hiatal hernia, GERD, PUD, hepatitis, liver disease, no renal disease, bowel prep and no morbid obesity       Endo/Other: Negative Endo/Other ROS   (+) no malignancy/cancer. (-) diabetes mellitus, hypothyroidism, hyperthyroidism, blood dyscrasia, arthritis, no electrolyte abnormalities, no malignancy/cancer               Abdominal:           Vascular: negative vascular ROS. - PVD, DVT and PE. Anesthesia Plan      MAC and general     ASA 2       Induction: intravenous. MIPS: Postoperative opioids intended and Prophylactic antiemetics administered. Anesthetic plan and risks discussed with patient. Plan discussed with CRNA. The patient was counseled at length about the risks of nati Covid-19 during their perioperative period and any recovery window from their procedure. The patient was made aware that nati Covid-19  may worsen their prognosis for recovering from their procedure  and lend to a higher morbidity and/or mortality risk.   All material risks, benefits, and reasonable alternatives including postponing the procedure were discussed. The patient DOES wish to proceed with the procedure at this time.           Amadou Power MD   7/18/2020

## 2020-07-18 NOTE — OP NOTE
COLONOSCOPY    DATE OF PROCEDURE: 7/18/2020    SURGEON: Bia Zacarias MD    ASSISTANT: None    PREOPERATIVE DIAGNOSIS: Screening colonoscopy    POSTOPERATIVE DIAGNOSIS: Extensive diverticulosis with minimal luminal narrowing, spasms sigmoid colon. Scattered diverticulosis in the rest of the colon including the right colon and cecum. Cecal polyp. OPERATION: Total colonoscopy and snare polypectomy from cecum    ANESTHESIA: MAC    ESTIMATED BLOOD LOSS: None    COMPLICATIONS: None     SPECIMENS:  Was Obtained: Cecal polyp    HISTORY: The patient is a 63y.o. year old female with history of above preop diagnosis. I recommended colonoscopy with possible biopsy or polypectomy and I explained the risk, benefits, expected outcome, and alternatives to the procedure. Risks included but are not limited to bleeding, infection, respiratory distress, hypotension, and perforation of the colon and possibility of missing a lesion. The patient understands and is in agreement. PROCEDURE:  The patient's SPO2 remained above 90% throughout the procedure. Digital rectal exam was normal.  The colonoscope was inserted through the anus into the rectum and advanced under direct vision to the cecum without difficulty. Terminal ileum was examined for approximately 2 inches. The prep was fair. Findings:  Terminal ileum: normal    Cecum/Ascending colon: abnormal: Also examined in the retroflexed view. In the base of the cecum there is a polyp which is about 7 to 10 mm. This is sessile. This polyp is removed using snare and cautery technique. Polypectomy specimen recovered through suction channel  Has a diverticulosis in the right colon and also in the base of the cecum      Transverse colon: normal, has a diverticulosis    Descending/Sigmoid colon: normal.  Has significant diverticulosis with some luminal narrowing. Able to advance the scope through this area without difficulty. Has some spasms.   No critical stricture noted. Rectum/Anus: examined in normal and retroflexed positions and was normal    Withdrawal Time was (minutes): 15          The colon was decompressed and the scope was removed. The patient tolerated the procedure without unusual events. During the procedure, the patient's blood pressure, pulse and oxygen saturation remained stable and documented. No unusual events occurred during the procedure. Patient was transferred to recovery room and will be discharged when criteria is met. Recommendations/Plan:   1. F/U Biopsies  2. F/U In Office as instructed  3. Discussed with the family  4. High fiber diet   5. Precautions to avoid constipation     To see in the office in the next 2 to 3 weeks    If patient had left flank pain, significant constipation, she may benefit with sigmoid colectomy    Next colonoscopy: 3 years. If Colonoscopy is less than 10 years the recommended reason is due:polyps, fair preparation, spasms in the sigmoid colon.     Electronically signed by Le Braga MD  on 7/18/2020 at 8:39 AM

## 2020-07-21 LAB — SURGICAL PATHOLOGY REPORT: NORMAL

## 2020-08-19 ENCOUNTER — OFFICE VISIT (OUTPATIENT)
Dept: GASTROENTEROLOGY | Age: 65
End: 2020-08-19
Payer: COMMERCIAL

## 2020-08-19 VITALS — WEIGHT: 195 LBS | BODY MASS INDEX: 33.47 KG/M2

## 2020-08-19 PROBLEM — K63.5 COLON POLYP: Status: ACTIVE | Noted: 2020-07-18

## 2020-08-19 PROCEDURE — 99214 OFFICE O/P EST MOD 30 MIN: CPT | Performed by: INTERNAL MEDICINE

## 2020-08-19 ASSESSMENT — ENCOUNTER SYMPTOMS
ABDOMINAL DISTENTION: 1
COUGH: 0
TROUBLE SWALLOWING: 0
BACK PAIN: 0
NAUSEA: 0
CHOKING: 0
SHORTNESS OF BREATH: 0
CONSTIPATION: 0
WHEEZING: 0
DIARRHEA: 0
SORE THROAT: 0
ABDOMINAL PAIN: 0
BLOOD IN STOOL: 0
VOICE CHANGE: 0

## 2020-08-19 NOTE — PROGRESS NOTES
GI OFFICE FOLLOW UP    INTERVAL HISTORY:   No referring provider defined for this encounter. Chief Complaint   Patient presents with    GI Problem     had colonoscopy, states symptoms are improved       No diagnosis found. HISTORY OF PRESENT ILLNESS: Jessica Nelson is a 63 y.o. female with a past history remarkable for diverticulosis, abdominal pain, referred for evaluation of   Chief Complaint   Patient presents with    GI Problem     had colonoscopy, states symptoms are improved   . Patient has history of abdominal pain. Recently she had a colonoscopy done and was found to have significant diverticulosis especially in the left colon. No evidence of diverticulitis noted. Has spasms in this left colon and also minimal luminal narrowing. On further discussion patient states that her constipation is getting better. She is taking high-fiber diet. She is not having recurrent pain. No urinary symptoms. No fever chills. Last time she had severe abdominal pain was in February 2020. Past Medical,Family, and Social History reviewed and does contribute to the patient presenting condition. Patient's PMH/PSH,SH,PSYCH Hx, MEDs, ALLERGIES, and ROS were all reviewed and updated in the appropriate sections. PAST MEDICAL HISTORY:  Past Medical History:   Diagnosis Date    Class 1 obesity due to excess calories without serious comorbidity with body mass index (BMI) of 32.0 to 32.9 in adult 5/1/2018    Colon polyp 07/18/2020    tubular adenoma    Tobacco abuse 5/1/2018       Past Surgical History:   Procedure Laterality Date    COLONOSCOPY N/A 7/18/2020    tubular adenoma    PARTIAL HYSTERECTOMY         CURRENT MEDICATIONS:  No current outpatient medications on file.     ALLERGIES:   Allergies   Allergen Reactions    Fish-Derived Products        FAMILY HISTORY:       Problem Relation Age of Onset    Heart Failure Mother     No Known Problems Father     Heart Failure Maternal Grandmother     Heart Failure Other     Heart Failure Maternal Aunt     Heart Failure Maternal Cousin          SOCIAL HISTORY:   Social History     Socioeconomic History    Marital status: Single     Spouse name: Not on file    Number of children: Not on file    Years of education: Not on file    Highest education level: Not on file   Occupational History    Not on file   Social Needs    Financial resource strain: Not on file    Food insecurity     Worry: Not on file     Inability: Not on file    Transportation needs     Medical: Not on file     Non-medical: Not on file   Tobacco Use    Smoking status: Current Every Day Smoker     Packs/day: 0.50     Years: 36.00     Pack years: 18.00     Types: Cigarettes    Smokeless tobacco: Never Used   Substance and Sexual Activity    Alcohol use: Yes     Comment: social    Drug use: No    Sexual activity: Not on file   Lifestyle    Physical activity     Days per week: Not on file     Minutes per session: Not on file    Stress: Not on file   Relationships    Social connections     Talks on phone: Not on file     Gets together: Not on file     Attends Anabaptism service: Not on file     Active member of club or organization: Not on file     Attends meetings of clubs or organizations: Not on file     Relationship status: Not on file    Intimate partner violence     Fear of current or ex partner: Not on file     Emotionally abused: Not on file     Physically abused: Not on file     Forced sexual activity: Not on file   Other Topics Concern    Not on file   Social History Narrative    Not on file         REVIEW OF SYSTEMS:         Review of Systems   Constitutional: Negative. Negative for appetite change, fatigue and unexpected weight change. HENT: Negative for dental problem, sore throat, trouble swallowing and voice change.     Eyes: Positive for visual disturbance. Respiratory: Negative for cough, choking, shortness of breath and wheezing. Gastrointestinal: Positive for abdominal distention (gas has improved). Negative for abdominal pain, blood in stool, constipation, diarrhea and nausea. Musculoskeletal: Negative for back pain and myalgias. Neurological: Positive for dizziness, weakness and light-headedness. Negative for tremors, numbness and headaches. Psychiatric/Behavioral: Positive for sleep disturbance. The patient is nervous/anxious. PHYSICAL EXAMINATION: Vital signs reviewed per the nursing documentation. Wt 195 lb (88.5 kg)   BMI 33.47 kg/m²   Body mass index is 33.47 kg/m². Physical Exam      LABORATORY DATA: Reviewed  Lab Results   Component Value Date    WBC 6.7 04/02/2020    HGB 13.3 04/02/2020    HCT 42.0 04/02/2020    MCV 71.8 (L) 04/02/2020     04/02/2020     04/02/2020    K 4.1 04/02/2020     04/02/2020    CO2 26 04/02/2020    BUN 10 04/02/2020    CREATININE 0.75 04/02/2020    LABALBU 3.9 04/02/2020    BILITOT 0.27 (L) 04/02/2020    ALKPHOS 93 04/02/2020    AST 17 04/02/2020    ALT 20 04/02/2020         Lab Results   Component Value Date    RBC 5.85 (H) 04/02/2020    HGB 13.3 04/02/2020    MCV 71.8 (L) 04/02/2020    MCH 22.7 (L) 04/02/2020    MCHC 31.7 04/02/2020    RDW 16.5 (H) 04/02/2020    MPV 10.5 04/02/2020    BASOPCT 0 04/02/2020    LYMPHSABS 2.75 04/02/2020    MONOSABS 0.55 04/02/2020    NEUTROABS 3.19 04/02/2020    EOSABS 0.18 04/02/2020    BASOSABS <0.03 04/02/2020         DIAGNOSTIC TESTING:     No results found. Assessment  No diagnosis found. Plan  Discussed with the patient regarding colonoscopy findings. I have a feeling that her abdominal pain especially in the left flank may be secondary to diverticular disease. If she has recurrent pain, she may benefit with sigmoid colectomy. Patient is explained regarding diverticulosis management.   Also discussed regarding possible sigmoid colectomy if she has persistent symptoms or recurrent symptoms. Patient had several questions regarding this and those were answered. Also advised high-fiber diet, fiber supplements and precautions to avoid constipation. Advised intermittent laxatives. If she has any significant pain, abdominal distention, fever to contact the physician immediately. Thank you for allowing me to participate in the care of Ms. Jaydon Daigle. For any further questions please do not hesitate to contact me. I have reviewed and agree with the ROS entered by the MA/LPN.          Anoop Moore MD,FACP, [facility]  Board Certified in Gastroenterology and [address] [place] [address] Gastroenterology  Office #: [phone]

## 2020-10-19 ENCOUNTER — TELEPHONE (OUTPATIENT)
Dept: GASTROENTEROLOGY | Age: 65
End: 2020-10-19

## 2020-10-19 NOTE — TELEPHONE ENCOUNTER
Called patient to confirm appointment for 10/20/20 and see if she can move up. She has to keep the time that she has. [place] office, ins, id, co-pay, mask, and no more than 5 minutes early. Writer thanked and call ended.

## 2020-10-20 ENCOUNTER — OFFICE VISIT (OUTPATIENT)
Dept: GASTROENTEROLOGY | Age: 65
End: 2020-10-20
Payer: COMMERCIAL

## 2020-10-20 VITALS
SYSTOLIC BLOOD PRESSURE: 135 MMHG | TEMPERATURE: 97.1 F | HEART RATE: 76 BPM | OXYGEN SATURATION: 98 % | BODY MASS INDEX: 32.44 KG/M2 | WEIGHT: 189 LBS | DIASTOLIC BLOOD PRESSURE: 89 MMHG

## 2020-10-20 PROCEDURE — 99214 OFFICE O/P EST MOD 30 MIN: CPT | Performed by: INTERNAL MEDICINE

## 2020-10-20 ASSESSMENT — ENCOUNTER SYMPTOMS
CHOKING: 0
TROUBLE SWALLOWING: 0
BLOOD IN STOOL: 0
CONSTIPATION: 0
WHEEZING: 0
SHORTNESS OF BREATH: 0
ABDOMINAL DISTENTION: 0
DIARRHEA: 0
SORE THROAT: 0
ABDOMINAL PAIN: 0
VOICE CHANGE: 0
COUGH: 0
NAUSEA: 0
RECTAL PAIN: 0
VOMITING: 0
ANAL BLEEDING: 0
BACK PAIN: 0

## 2020-10-20 NOTE — PROGRESS NOTES
GI OFFICE FOLLOW UP    INTERVAL HISTORY:   No referring provider defined for this encounter. Chief Complaint   Patient presents with   Nathaniel Emery is here for a 2 month f/u on colonoscopy       No diagnosis found. HISTORY OF PRESENT ILLNESS: Caren Perez is a 63 y.o. female with a past history remarkable for pelvic pain left flank pain, referred for evaluation of   Chief Complaint   Patient presents with   Nathaniel Emery is here for a 2 month f/u on colonoscopy   . Patient seen for follow-up of abdominal pain. The pain was mostly in the pelvic area and left lower quadrant. The pain was constant dull pain. Not related to micturition. No pelvic discharge. No fever chills. No radiation of the pain. Patient was seen by me in August 2020 and at that time patient was managed conservatively. At present she is taking high-fiber diet, fiber supplements. She does not strain at bowel movements. Her abdominal pain completely resolved. She is having satisfactory bowel movements. No fever chills. No nausea vomiting. Colonoscopy revealed mild narrowing of the sigmoid colon secondary to diverticulosis. Other scattered diverticulosis in the rest of the colon as well. Overall her abdominal pain improved and she feels satisfied with the improved  Past Medical,Family, and Social History reviewed and does contribute to the patient presenting condition. Patient's PMH/PSH,SH,PSYCH Hx, MEDs, ALLERGIES, and ROS were all reviewed and updated in the appropriate sections.     PAST MEDICAL HISTORY:  Past Medical History:   Diagnosis Date    Class 1 obesity due to excess calories without serious comorbidity with body mass index (BMI) of 32.0 to 32.9 in adult 5/1/2018    Colon polyp 07/18/2020    tubular adenoma    Tobacco abuse 5/1/2018       Past Surgical History Narrative    Not on file         REVIEW OF SYSTEMS:         Review of Systems   Constitutional: Negative. Negative for appetite change, fatigue and unexpected weight change. HENT: Negative for dental problem, sore throat, trouble swallowing and voice change. Eyes: Positive for visual disturbance. Respiratory: Negative for cough, choking, shortness of breath and wheezing. Cardiovascular: Negative for chest pain, palpitations and leg swelling. Gastrointestinal: Negative for abdominal distention (gas has improved), abdominal pain, anal bleeding, blood in stool, constipation, diarrhea, nausea, rectal pain and vomiting. Musculoskeletal: Negative for back pain and myalgias. Neurological: Negative for dizziness, tremors, weakness, light-headedness, numbness and headaches. Psychiatric/Behavioral: Negative for sleep disturbance. The patient is not nervous/anxious. PHYSICAL EXAMINATION: Vital signs reviewed per the nursing documentation. /89   Pulse 76   Temp 97.1 °F (36.2 °C)   Wt 189 lb (85.7 kg)   SpO2 98%   BMI 32.44 kg/m²   Body mass index is 32.44 kg/m².    Physical Exam      LABORATORY DATA: Reviewed  Lab Results   Component Value Date    WBC 6.7 04/02/2020    HGB 13.3 04/02/2020    HCT 42.0 04/02/2020    MCV 71.8 (L) 04/02/2020     04/02/2020     04/02/2020    K 4.1 04/02/2020     04/02/2020    CO2 26 04/02/2020    BUN 10 04/02/2020    CREATININE 0.75 04/02/2020    LABALBU 3.9 04/02/2020    BILITOT 0.27 (L) 04/02/2020    ALKPHOS 93 04/02/2020    AST 17 04/02/2020    ALT 20 04/02/2020         Lab Results   Component Value Date    RBC 5.85 (H) 04/02/2020    HGB 13.3 04/02/2020    MCV 71.8 (L) 04/02/2020    MCH 22.7 (L) 04/02/2020    MCHC 31.7 04/02/2020    RDW 16.5 (H) 04/02/2020    MPV 10.5 04/02/2020    BASOPCT 0 04/02/2020    LYMPHSABS 2.75 04/02/2020    MONOSABS 0.55 04/02/2020    NEUTROABS 3.19 04/02/2020    EOSABS 0.18 04/02/2020    BASOSABS <0.03 04/02/2020         DIAGNOSTIC TESTING:     No results found. Assessment  No diagnosis found. Plan  Patient pelvic, left lower quadrant pain appears to be secondary to diverticular disease which is resolved. Discussed with the patient to contact me if she develops recurrent pain. If she has recurrent pain she may benefit with sigmoid colectomy. Meanwhile advised to continue high-fiber diet, fiber supplements and precautions to avoid constipation. Also advised intermittent laxatives if she has constipation. Thank you for allowing me to participate in the care of Ms. Gabby Mendoza. For any further questions please do not hesitate to contact me. I have reviewed and agree with the ROS entered by the MA/LPN. Note is dictated utilizing voice recognition software. Unfortunately this leads to occasional typographical errors.  Please contact our office if you have any questions        Jhoan Tenorio MD,FACP, [facility]  Board Certified in Gastroenterology and [address] [place] [address] Gastroenterology  Office #: [phone]

## 2020-12-04 ENCOUNTER — TELEPHONE (OUTPATIENT)
Dept: PRIMARY CARE CLINIC | Age: 65
End: 2020-12-04

## 2020-12-04 NOTE — TELEPHONE ENCOUNTER
Patient called to see if you could call in a prescription for Flagyl. She stated she has an odor and itching. Please advise      Health Maintenance   Topic Date Due    Hepatitis C screen  1955    Pneumococcal 0-64 years Vaccine (1 of 1 - PPSV23) 05/29/1963    DTaP/Tdap/Td vaccine (1 - Tdap) 05/29/1976    Cervical cancer screen  05/29/1978    Shingles Vaccine (1 of 2) 05/29/2007    Breast cancer screen  07/21/2018    Flu vaccine (1) 09/01/2020    Lipid screen  04/02/2025    Colon cancer screen colonoscopy  07/18/2030    Hepatitis A vaccine  Aged Out    Hepatitis B vaccine  Aged Out    Hib vaccine  Aged Out    Meningococcal (ACWY) vaccine  Aged Out    HIV screen  Discontinued             (applicable per patient's age: Cancer Screenings, Depression Screening, Fall Risk Screening, Immunizations)    LDL Cholesterol (mg/dL)   Date Value   04/02/2020 164 (H)     AST (U/L)   Date Value   04/02/2020 17     ALT (U/L)   Date Value   04/02/2020 20     BUN (mg/dL)   Date Value   04/02/2020 10      (goal A1C is < 7)   (goal LDL is <100) need 30-50% reduction from baseline     BP Readings from Last 3 Encounters:   10/20/20 135/89   07/18/20 (!) 136/93   07/18/20 115/76    (goal /80)      All Future Testing planned in [facility]:  Lab Frequency Next Occurrence   [facility] Digital Screen Bilateral [[id number]] Once 10/06/2020       Next Visit Date:  No future appointments.          Patient Active Problem List:     Class 2 obesity due to excess calories with body mass index (BMI) of 35.0 to 35.9 in adult     Tobacco abuse     Tobacco abuse counseling     Colon polyp

## 2020-12-07 ENCOUNTER — OFFICE VISIT (OUTPATIENT)
Dept: PRIMARY CARE CLINIC | Age: 65
End: 2020-12-07
Payer: COMMERCIAL

## 2020-12-07 ENCOUNTER — HOSPITAL ENCOUNTER (OUTPATIENT)
Age: 65
Setting detail: SPECIMEN
Discharge: HOME | End: 2020-12-07
Payer: COMMERCIAL

## 2020-12-07 VITALS
TEMPERATURE: 98.3 F | BODY MASS INDEX: 32.75 KG/M2 | SYSTOLIC BLOOD PRESSURE: 149 MMHG | DIASTOLIC BLOOD PRESSURE: 110 MMHG | OXYGEN SATURATION: 93 % | HEART RATE: 79 BPM | WEIGHT: 190.8 LBS

## 2020-12-07 LAB
BILIRUBIN, POC: NEGATIVE
BLOOD URINE, POC: NEGATIVE
CLARITY, POC: ABNORMAL
COLOR, POC: YELLOW
GLUCOSE URINE, POC: NEGATIVE
KETONES, POC: NEGATIVE
LEUKOCYTE EST, POC: ABNORMAL
NITRITE, POC: NEGATIVE
PH, POC: 7
PROTEIN, POC: NEGATIVE
SPECIFIC GRAVITY, POC: 1.02
UROBILINOGEN, POC: 0.2

## 2020-12-07 PROCEDURE — 81003 URINALYSIS AUTO W/O SCOPE: CPT | Performed by: PHYSICIAN ASSISTANT

## 2020-12-07 PROCEDURE — 99396 PREV VISIT EST AGE 40-64: CPT | Performed by: PHYSICIAN ASSISTANT

## 2020-12-07 RX ORDER — METRONIDAZOLE 500 MG/1
500 TABLET ORAL 2 TIMES DAILY
Qty: 14 TABLET | Refills: 0 | Status: SHIPPED | OUTPATIENT
Start: 2020-12-07 | End: 2020-12-14

## 2020-12-07 NOTE — PROGRESS NOTES
Visit Information    Have you changed or started any medications since your last visit including any over-the-counter medicines, vitamins, or herbal medicines? no   Are you having any side effects from any of your medications? -  no  Have you stopped taking any of your medications? Is so, why? -  no    Have you seen any other physician or provider since your last visit? Yes - Records Obtained  Have you had any other diagnostic tests since your last visit? Yes - Records Obtained  Have you been seen in the emergency room and/or had an admission to a hospital since we last saw you? Yes - Records Obtained  Have you had your routine dental cleaning in the past 6 months? no    Have you activated your [employer] account? If not, what are your barriers?  No: Pending     Patient Care Team:  Marcella Khan PA-C as PCP - General (Physician Assistant)  Marcella Khan PA-C as PCP - [facility] Provider    Medical History Review  Past Medical, Family, and Social History reviewed and does contribute to the patient presenting condition    Health Maintenance   Topic Date Due    Hepatitis C screen  1955    Pneumococcal 0-64 years Vaccine (1 of 1 - PPSV23) 05/29/1963    DTaP/Tdap/Td vaccine (1 - Tdap) 05/29/1976    Cervical cancer screen  05/29/1978    Shingles Vaccine (1 of 2) 05/29/2007    Breast cancer screen  07/21/2018    Flu vaccine (1) 09/01/2020    Lipid screen  04/02/2025    Colon cancer screen colonoscopy  07/18/2030    Hepatitis A vaccine  Aged Out    Hepatitis B vaccine  Aged Out    Hib vaccine  Aged Out    Meningococcal (ACWY) vaccine  Aged Out    HIV screen  Discontinued

## 2020-12-07 NOTE — PROGRESS NOTES
[facility]  [id number] [facility] [address]  Dept: [phone]  Dept Fax: [phone]    Office Progress/Follow Up Note    Date of patient's visit: 12/7/2020    Patient's Name:  Adrien Graham YOB: 1955            Patient Care Team:  Venecia Lamb PA-C as PCP - General (Physician Assistant)  Venecia Lamb [facility]-C [facility] PCP - [facility]  ================================================================    REASON FOR VISIT/CHIEF COMPLAINT:  Vaginal Odor (onset 5days ago, Fishy odor)    HISTORY OF PRESENTING ILLNESS:  History was obtained from: patientMesfin Graham is a 63 y.o. is here for physical, complaining of vaginal odor. Vaginal Discharge   The patient's primary symptoms include genital itching, a genital odor and vaginal discharge. The patient's pertinent negatives include no genital lesions or vaginal bleeding. This is a new problem. The current episode started in the past 7 days. The problem occurs daily. The problem has been unchanged. The patient is experiencing no pain. Pertinent negatives include no abdominal pain, back pain, chills, constipation, diarrhea, dysuria, fever, frequency, headaches, nausea, sore throat, urgency or vomiting. The vaginal discharge was yellow and malodorous. There has been no bleeding. She has not been passing clots. She has not been passing tissue. Nothing aggravates the symptoms. She has tried nothing for the symptoms. She is sexually active. It is unknown whether or not her partner has an STD. Her past medical history is significant for vaginosis. Patient completed vaginal swab in office will be sent to lab for evaluation. Patient provided urine sample in office for UA that was positive for WBC, otherwise unremarkable.   Discussed vaginal odor and discharge, acute cystitis, medications in depth with patient, informed patient urine sample will be sent for urine culture, informed patient we will treat her symptoms like bacterial vaginosis and prescribed antibiotic, metronidazole, to take, informed patient we will wait for urine culture to be completed if we need to add additional medication for cystitis, patient voiced understanding. Patient blood pressure is elevated in office. Discussed elevated blood pressure and risk in depth with patient, informed patient will follow up with her at her next appointment and if elevated will discuss blood pressure medication. Patient has lost 16 pounds. Discussed weight loss in depth with patient. Labs reviewed. Health maintenance reviewed. Medications reviewed, prescribed Metronidazole 500 mg twice daily for 7 days. Vaginal Discharge   The patient's primary symptoms include genital itching, a genital odor and vaginal discharge. The patient's pertinent negatives include no genital lesions or vaginal bleeding. This is a new problem. The current episode started in the past 7 days. The problem occurs daily. The problem has been unchanged. The patient is experiencing no pain. Pertinent negatives include no abdominal pain, back pain, chills, constipation, diarrhea, dysuria, fever, frequency, headaches, nausea, sore throat, urgency or vomiting. The vaginal discharge was yellow and malodorous. There has been no bleeding. She has not been passing clots. She has not been passing tissue. Nothing aggravates the symptoms. She has tried nothing for the symptoms. She is sexually active. It is unknown whether or not her partner has an STD. Her past medical history is significant for vaginosis.        Patient Active Problem List   Diagnosis    Class 2 obesity due to excess calories with body mass index (BMI) of 35.0 to 35.9 in adult    Tobacco abuse    Tobacco abuse counseling    Colon polyp       Health Maintenance Due   Topic Date Due    Hepatitis C screen  1955    Pneumococcal 0-64 years Vaccine (1 of 1 - PPSV23) 05/29/1963    DTaP/Tdap/Td vaccine (1 - Tdap) 05/29/1976    Cervical cancer screen  05/29/1978    Shingles Vaccine (1 of 2) 05/29/2007    Breast cancer screen  07/21/2018       Allergies   Allergen Reactions    Fish-Derived Products          Current Outpatient Medications   Medication Sig Dispense Refill    metroNIDAZOLE (FLAGYL) 500 MG tablet Take 1 tablet by mouth 2 times daily for 7 days 14 tablet 0     No current facility-administered medications for this visit. Social History     Tobacco Use    Smoking status: Current Every Day Smoker     Packs/day: 0.50     Years: 36.00     Pack years: 18.00     Types: Cigarettes    Smokeless tobacco: Never Used   Substance Use Topics    Alcohol use: Yes     Comment: social    Drug use: No       Family History   Problem Relation Age of Onset    Heart Failure Mother     No Known Problems Father     Heart Failure Maternal Grandmother     Heart Failure Other     Heart Failure Maternal Aunt     Heart Failure Maternal Cousin         REVIEW OFSYSTEMS:  Review of Systems   Constitutional: Negative for chills and fever. HENT: Negative for congestion, hearing loss, rhinorrhea and sore throat. Eyes: Negative for pain and visual disturbance. Respiratory: Negative for cough, shortness of breath and wheezing. Cardiovascular: Negative for chest pain. Gastrointestinal: Negative for abdominal pain, constipation, diarrhea, nausea and vomiting. Genitourinary: Positive for vaginal discharge. Negative for difficulty urinating, dysuria, frequency, urgency, vaginal bleeding and vaginal pain. Musculoskeletal: Negative for arthralgias, back pain and myalgias. Neurological: Negative for dizziness and headaches.        PHYSICAL EXAM:  Vitals:    12/07/20 1526 12/07/20 1635   BP: (!) 140/98 (!) 149/110   Site: Left Upper Arm    Position: Sitting    Cuff Size: Large Adult    Pulse: 89 79   Temp: 98.3 °F (36.8 °C)    TempSrc: Temporal    SpO2: 93% Weight: 190 lb 12.8 oz (86.5 kg)      BP Readings from Last 3 Encounters:   12/07/20 (!) 149/110   10/20/20 135/89   07/18/20 (!) 136/93        Physical Exam  Vitals signs reviewed. Constitutional:       Appearance: Normal appearance. She is well-developed and well-groomed. She is obese. HENT:      Head: Normocephalic and atraumatic. Eyes:      Conjunctiva/sclera: Conjunctivae normal.      Pupils: Pupils are equal, round, and reactive to light. Neck:      Musculoskeletal: Normal range of motion. Cardiovascular:      Rate and Rhythm: Normal rate and regular rhythm. Heart sounds: Normal heart sounds. Pulmonary:      Effort: Pulmonary effort is normal.      Breath sounds: Normal breath sounds. Abdominal:      Palpations: Abdomen is soft. There is no mass. Tenderness: There is no abdominal tenderness. Musculoskeletal: Normal range of motion. General: No tenderness. Skin:     General: Skin is warm and dry. Neurological:      Mental Status: She is alert and oriented to person, place, and time. Psychiatric:         Behavior: Behavior is cooperative. DIAGNOSTIC FINDINGS:  CBC:  Lab Results   Component Value Date    WBC 6.7 04/02/2020    HGB 13.3 04/02/2020     04/02/2020       BMP:    Lab Results   Component Value Date     04/02/2020    K 4.1 04/02/2020     04/02/2020    CO2 26 04/02/2020    BUN 10 04/02/2020    CREATININE 0.75 04/02/2020    GLUCOSE 96 04/02/2020       HEMOGLOBIN A1C: No results found for: LABA1C    FASTING LIPID PANEL:  Lab Results   Component Value Date    CHOL 255 (H) 04/02/2020    HDL 48 04/02/2020    TRIG 213 (H) 04/02/2020       ASSESSMENT AND PLAN:  Dorothy Barajas was seen today for vaginal odor. Diagnoses and all orders for this visit:    Annual physical exam    Vaginal odor  -     POCT Urinalysis No Micro (Auto)  -     metroNIDAZOLE (FLAGYL) 500 MG tablet;  Take 1 tablet by mouth 2 times daily for 7 days  -     Vaginitis DNA Probe; Future    Vaginal discharge  -     metroNIDAZOLE (FLAGYL) 500 MG tablet; Take 1 tablet by mouth 2 times daily for 7 days  -     Vaginitis DNA Probe; Future    Acute cystitis without hematuria  -     Culture, Urine; Future    Class 1 obesity due to excess calories with body mass index (BMI) of 32.0 to 32.9 in adult, unspecified whether serious comorbidity present      FOLLOW UP AND INSTRUCTIONS:  Return in about 1 month (around 1/7/2021) for HTN, Tobacco Use, Obesity. · Fede Wood received counseling on the following healthy behaviors:nutrition and exercise    · Discussed use, benefit, and side effects of prescribed medications. Barriers to medication compliance addressed. All patient questions answered. Pt voiced understanding. · Patient given educational materials - see patient instructions    Electronically signed by Mary Jo Key PA-C on 12/7/20 at 4:09 PM EST    This note is created with the assistance of a speech-recognition program. While intending to generate a document that actually reflects the content of the visit, the document can still have some mistakes which may not have been identified and corrected by editing.

## 2020-12-08 LAB
DIRECT EXAM: NORMAL
Lab: NORMAL
SPECIMEN DESCRIPTION: NORMAL

## 2020-12-08 ASSESSMENT — ENCOUNTER SYMPTOMS
DIARRHEA: 0
EYE PAIN: 0
CONSTIPATION: 0
WHEEZING: 0
BACK PAIN: 0
SHORTNESS OF BREATH: 0
SORE THROAT: 0
RHINORRHEA: 0
VOMITING: 0
ABDOMINAL PAIN: 0
COUGH: 0
NAUSEA: 0

## 2020-12-09 LAB
CULTURE: NORMAL
Lab: NORMAL
SPECIMEN DESCRIPTION: NORMAL

## 2021-06-15 ENCOUNTER — TELEPHONE (OUTPATIENT)
Dept: PRIMARY CARE CLINIC | Age: 66
End: 2021-06-15

## 2021-06-15 NOTE — TELEPHONE ENCOUNTER
Patient called stating she needs her Disability paper work filled out this week so she can send it back  ,but hasn't been in office to see chencho since last year ,NTP appt scheduled with you  8/9 . Please advise

## 2021-06-24 ENCOUNTER — HOSPITAL ENCOUNTER (OUTPATIENT)
Age: 66
Setting detail: SPECIMEN
Discharge: HOME | End: 2021-06-24
Payer: COMMERCIAL

## 2021-06-24 ENCOUNTER — OFFICE VISIT (OUTPATIENT)
Dept: PRIMARY CARE CLINIC | Age: 66
End: 2021-06-24
Payer: COMMERCIAL

## 2021-06-24 VITALS
OXYGEN SATURATION: 96 % | HEART RATE: 83 BPM | TEMPERATURE: 97.3 F | SYSTOLIC BLOOD PRESSURE: 145 MMHG | WEIGHT: 203 LBS | BODY MASS INDEX: 34.84 KG/M2 | DIASTOLIC BLOOD PRESSURE: 98 MMHG

## 2021-06-24 DIAGNOSIS — N89.8 VAGINAL DISCHARGE: ICD-10-CM

## 2021-06-24 DIAGNOSIS — M25.552 HIP DISCOMFORT, LEFT: Primary | ICD-10-CM

## 2021-06-24 DIAGNOSIS — Z12.31 ENCOUNTER FOR SCREENING MAMMOGRAM FOR BREAST CANCER: ICD-10-CM

## 2021-06-24 PROCEDURE — 99214 OFFICE O/P EST MOD 30 MIN: CPT | Performed by: NURSE PRACTITIONER

## 2021-06-24 RX ORDER — METRONIDAZOLE 500 MG/1
500 TABLET ORAL 2 TIMES DAILY
Qty: 14 TABLET | Refills: 0 | Status: SHIPPED | OUTPATIENT
Start: 2021-06-24 | End: 2021-07-01

## 2021-06-24 SDOH — ECONOMIC STABILITY: FOOD INSECURITY: WITHIN THE PAST 12 MONTHS, YOU WORRIED THAT YOUR FOOD WOULD RUN OUT BEFORE YOU GOT MONEY TO BUY MORE.: SOMETIMES TRUE

## 2021-06-24 SDOH — ECONOMIC STABILITY: FOOD INSECURITY: WITHIN THE PAST 12 MONTHS, THE FOOD YOU BOUGHT JUST DIDN'T LAST AND YOU DIDN'T HAVE MONEY TO GET MORE.: SOMETIMES TRUE

## 2021-06-24 ASSESSMENT — PATIENT HEALTH QUESTIONNAIRE - PHQ9
2. FEELING DOWN, DEPRESSED OR HOPELESS: 0
SUM OF ALL RESPONSES TO PHQ9 QUESTIONS 1 & 2: 0
SUM OF ALL RESPONSES TO PHQ QUESTIONS 1-9: 0
SUM OF ALL RESPONSES TO PHQ QUESTIONS 1-9: 0
1. LITTLE INTEREST OR PLEASURE IN DOING THINGS: 0
SUM OF ALL RESPONSES TO PHQ QUESTIONS 1-9: 0

## 2021-06-24 ASSESSMENT — ENCOUNTER SYMPTOMS
BACK PAIN: 0
VOMITING: 0
EYE PAIN: 0
NAUSEA: 0
WHEEZING: 0
SORE THROAT: 0
COUGH: 0
ABDOMINAL PAIN: 0
CONSTIPATION: 0
DIARRHEA: 0
SHORTNESS OF BREATH: 0
RHINORRHEA: 0

## 2021-06-24 ASSESSMENT — SOCIAL DETERMINANTS OF HEALTH (SDOH): HOW HARD IS IT FOR YOU TO PAY FOR THE VERY BASICS LIKE FOOD, HOUSING, MEDICAL CARE, AND HEATING?: SOMEWHAT HARD

## 2021-06-24 NOTE — PROGRESS NOTES
[facility]  [address] [facility] [address]  Dept: [phone]  Dept Fax: [phone]    Patient Care Team:  KIKA Felipe CNP as PCP - General (Family Medicine)    2021    Patti Ramirez (:  1955) marquise 61 y.o. female, here for evaluation of the following medical concerns:   Chief Complaint   Patient presents with   Frandy Ferguson Doctor     FMLA    Knee Problem     Rt    Leg Problem     Left, States\"hard to lift since broken toe in Dec\"         Patti Ramirez is a 62-year-old female here today to establish care. Recently, she was following with a different provider who recently left our office. He states she has no persistent chronic medical conditions. She is a current cigarette smoker. Today she is asking for assistance in completing paperwork for unemployment. She was filling out paperwork for her unemployment, hit a wring button accidentally stating she \"could not work\"  Now she needs additional paperwork stating she is medically able to work  Blanca Orlando denies any chronic mobility issues. No musculoskeletal concerns, no previous surgeries. Over the last 7 months she's been having limited ROM of the left hip. No longer able to cross her left leg on her lap. Does have some difficulty putting on a sock or shoe. No pain or swelling over the hip  No back pain or groin pain. No known injuries to the hip. She does note however, that this seemed to occur after she broke the fourth digit of her left foot in December. Vaginal Discharge  The patient's primary symptoms include vaginal discharge. The patient's pertinent negatives include no genital itching, genital lesions, genital odor, genital rash, pelvic pain or vaginal bleeding. This is a new problem. The current episode started 1 to 4 weeks ago (wonder if soap in a hotel caused irritation).  Associated symptoms include painful intercourse. Pertinent negatives include no abdominal pain, back pain, chills, constipation, diarrhea, dysuria, fever, frequency, headaches, hematuria, nausea, rash, sore throat, urgency or vomiting. The vaginal discharge was white, malodorous and thick. There has been no bleeding. The symptoms are aggravated by intercourse. Review of Systems   Constitutional: Negative for chills and fever. HENT: Negative for congestion, hearing loss, rhinorrhea and sore throat. Eyes: Negative for pain and visual disturbance. Respiratory: Negative for cough, shortness of breath and wheezing. Cardiovascular: Negative for chest pain. Gastrointestinal: Negative for abdominal pain, constipation, diarrhea, nausea and vomiting. Genitourinary: Positive for vaginal discharge. Negative for difficulty urinating, dysuria, frequency, hematuria, pelvic pain, urgency, vaginal bleeding and vaginal pain. Musculoskeletal: Negative for arthralgias, back pain, gait problem, joint swelling and myalgias. Skin: Negative for rash and wound. Neurological: Negative for dizziness and headaches. Prior to Visit Medications    Medication Sig Taking?  Authorizing Provider   metroNIDAZOLE (FLAGYL) 500 MG tablet Take 1 tablet by mouth 2 times daily for 7 days Yes KIKA Felipe - GARRETT        Allergies   Allergen Reactions    Fish-Derived Products        Past Medical History:   Diagnosis Date    Class 1 obesity due to excess calories without serious comorbidity with body mass index (BMI) of 32.0 to 32.9 in adult 5/1/2018    Colon polyp 07/18/2020    tubular adenoma    Tobacco abuse 5/1/2018       Past Surgical History:   Procedure Laterality Date    COLONOSCOPY N/A 7/18/2020    tubular adenoma    PARTIAL HYSTERECTOMY         Social History     Socioeconomic History    Marital status: Single     Spouse name: Not on file    Number of children: Not on file    Years of education: Not on file    Highest education level: Not on file   Occupational History    Not on file   Tobacco Use    Smoking status: Current Every Day Smoker     Packs/day: 0.50     Years: 36.00     Pack years: 18.00     Types: Cigarettes    Smokeless tobacco: Never Used   Vaping Use    Vaping Use: Never used   Substance and Sexual Activity    Alcohol use: Yes     Comment: social    Drug use: No    Sexual activity: Not on file   Other Topics Concern    Not on file   Social History Narrative    Not on file     Social Determinants of Health     Financial Resource Strain: Medium Risk    Difficulty of Paying Living Expenses: Somewhat hard   Food Insecurity: Food Insecurity Present    Worried About Running Out of Food in the Last Year: Sometimes true    Alberto of Food in the Last Year: Sometimes true   Transportation Needs:     Lack of Transportation (Medical):      Lack of Transportation (Non-Medical):    Physical Activity:     Days of Exercise per Week:     Minutes of Exercise per Session:    Stress:     Feeling of Stress :    Social Connections:     Frequency of Communication with Friends and Family:     Frequency of Social Gatherings with Friends and Family:     Attends Zoroastrianism Services:     Active Member of Clubs or Organizations:     Attends Club or Organization Meetings:     Marital Status:    Intimate Partner Violence:     Fear of Current or Ex-Partner:     Emotionally Abused:     Physically Abused:     Sexually Abused:         Family History   Problem Relation Age of Onset    Heart Failure Mother     No Known Problems Father     Heart Failure Maternal Grandmother     Heart Failure Other     Heart Failure Maternal Aunt     Heart Failure Maternal Cousin        Vitals:    06/24/21 1110 06/24/21 1216   BP: (!) 134/96 (!) 145/98   Site: Left Upper Arm    Position: Sitting    Cuff Size: Large Adult    Pulse: 83    Temp: 97.3 °F (36.3 °C)    TempSrc: Temporal    SpO2: 96%    Weight: 203 lb (92.1 kg)      Estimated body mass index is 34.84 kg/m² as calculated from the following:    Height as of 7/18/20: 5' 4\" (1.626 m). Weight as of this encounter: 203 lb (92.1 kg). Physical Exam  Vitals reviewed. Constitutional:       Appearance: Normal appearance. She is well-developed and well-groomed. She is obese. HENT:      Head: Normocephalic and atraumatic. Eyes:      Conjunctiva/sclera: Conjunctivae normal.      Pupils: Pupils are equal, round, and reactive to light. Cardiovascular:      Rate and Rhythm: Normal rate and regular rhythm. Heart sounds: Normal heart sounds. Pulmonary:      Effort: Pulmonary effort is normal.      Breath sounds: Normal breath sounds. Abdominal:      Palpations: Abdomen is soft. There is no mass. Tenderness: There is no abdominal tenderness. Musculoskeletal:         General: No tenderness. Normal range of motion. Cervical back: Normal range of motion. Skin:     General: Skin is warm and dry. Neurological:      Mental Status: She is alert and oriented to person, place, and time. Psychiatric:         Behavior: Behavior is cooperative. ASSESSMENT     Diagnosis Orders   1. Hip discomfort, left     2. Encounter for screening mammogram for breast cancer  [facility] Digital Screen Bilateral [[id number]]   3. Vaginal discharge  VAGINITIS DNA PROBE    metroNIDAZOLE (FLAGYL) 500 MG tablet          PLAN:  Orders Placed This Encounter   Medications    metroNIDAZOLE (FLAGYL) 500 MG tablet     Sig: Take 1 tablet by mouth 2 times daily for 7 days     Dispense:  14 tablet     Refill:  0         1. Form completed, copy in chart. 2. Discussed hip complaint with patient. We reviewed that likely, an x-ray which showed mild degeneration of the hip given her age and weight. Given lack of pain or swelling, I do not expect to find any fracture or infectious cause.   She is agreeable to initiating treatment with home exercises for the hip and will follow up if not improving and will progress to formal physical therapy. 3. Given vaginal discharge symptoms, suspect bacterial vaginosis. Will start Flagyl and obtain vaginal swab. 4. Mammogram due, order placed. Patient states she does follow with [address] [place] [address] gynecology and does still have cervix from partial hysterectomy. Advised to contact gynecology to set up needed cervical cancer screen. 5. Blood pressure is elevated today, was also elevated last visit. Discussed concerns with patient and advised that after 3 visits with elevated blood pressure, we would initiate blood pressure medication. Routine follow-up in 5 to 6 months. FOLLOW UP AND INSTRUCTIONS:  Return in about 6 months (around 12/24/2021) for HTN. · Aggie Celis received counseling on the following healthy behaviors:exercise, medication adherence and tobacco cessation    · Discussed use, benefit, and side effects of prescribed medications. Barriers to  medication compliance addressed. All patient questions answered. Pt voiced  understanding. · Patient given educational materials - see patient instructions    · Patient advised to contact scheduling offices for any referrals or imaging orders  placed today if they have not been contacted in 48 hours. Return in about 6 months (around 12/24/2021) for HTN. An  electronic signature was used to authenticate this note. --Shahrzad Diop, APRN - CNP on 6/24/2021 at 12:44 PM  Visit Information    Have you changed or started any medications since your last visit including any over-the-counter medicines, vitamins, or herbal medicines? no   Are you having any side effects from any of your medications? -  no  Have you stopped taking any of your medications? Is so, why? -  no    Have you seen any other physician or provider since your last visit? No  Have you had any other diagnostic tests since your last visit? No  Have you been seen in the emergency room and/or had an admission to a hospital since we last saw you?  No  Have you had your

## 2021-06-24 NOTE — PATIENT INSTRUCTIONS
FOOD RESOURCES      RESOURCE SERVICE PHONE [employer]   Outreach of Paul Cota [phone]  Administrative www.iccatdarby. org   Mustard [employer] [phone]  Brennon. Jacyann 47 - and Fax N/A   Sable Stall of Beth [phone] 4700 Lady Vianca Devlin [phone] [address] / Juliette Bath [phone]  [place] 2361 Assistance Programs [phone]  Emirlizeth Fuller. [address] Feed Your Neighbor [phone]  Lisa Queen 178 [phone]  [address] [facility] [address] [phone] [address] [phone]  Service-Intake www.jayneationarmynwohio. 5353 [facility] Emergency Food Pantry [phone]  Service-Intake www.University of Michigan Healtho. [facility] Pálcamacho U. 91. [phone]  Service-Intake www.stpaulscommunitycenter. [address] [place] [address] [phone]  Administrative www. javierncefrainChildren's Hospital Colorado South Campusdon. [address] Bank Back Pack Program [phone]  Gertrudis Lowery. [address] Meals Program [phone] 450 [facility] and Shelter for men www.[facility] Health SystemcuTrans[facility]. [facility] FOR SURGERY for Social and 351 [facility] 8258 4733 www. Three Rivers Hospitalanicetontertokunalo. 2250 [address] [place] [address] [phone]  [facility] Ney Sandoval 187. com    Orthodoxy Women [employer] Emergency Assistance [phone] [address] Feed Your Neighbor [phone] Administrative N/A   [facility] 65 22 [phone]  [address] / Geraldene Pahala [phone] 112 [facility] [phone]  300 [facility] Pantry / Meals [phone]  Ul[address]  www. [facility]. [facility] 50 Pantry / Auto[employer] Insurance [phone]  Administrative www. pamela. [facility] [phone]  [address] of 1501 [facility] / Albino Haw [phone]  1840 Wealthy  Se Pantry and Grooming Supplies [phone]  [address]  http://sahni.info/    300 South Kwasichaim Kruger [phone]  1300 [facility] PASSAVANT-CRANBERRY-ER Positive Living Program [phone] ext: 42 5485 [facility] www. HealthiNation. [employer] Stag Farms    Food for [address] 42-96-72-73 www. feedtoledo. Gaetano 50 Pantry [phone]  [address] Assembly of [phone] http://www. Digital EnvoytheTheme Travel News (TTN)ion. Medtrics Lab    775 [facility] [phone]  227 [facility] Family Pantry and South Deyvi [phone] nightingales-harvest.org    201 [facility] [phone]  Ul. [facility] www. SHCFDHXPN77.GVE    [facility] [phone]  Administrative factoledo. [address] [place] [address],15Th Floor [phone]  Toll Free www. Gojimo    Body of 3 [facility] for [place] 1 Pantry (796) [facility] for [facility] & Women [phone]  Service-Intake www.Mary Rutan Hospitalrescuemission. 297 [facility] for Families, Pregnant Women [phone] ext: [facility]. org    Restorationist Charities Hansjuanita of The [employer]Kick[employer] of Z Plane for Families, Pregnant Women [phone]  Service-Intake www.YoozonriScalitynwo. 793 [facility] [phone]  2309 [facility] & Women [phone] www. Round Mountainstreetmission.  Lisa Downing [phone]  Service-Intake N/A   TASC of [place] 6558 for Ex-Offenders [phone] of [place] 6558 for Women [phone]  1434 [facility] Rape Crisis Hotline www.Worldcoo. [address] [place] [address] for Individuals with AIDS/HIV, Rent Payment Assistance for individuals with AIDS/HIV (710) [phone] ext: 9352 [facility]. Qianxs.com. Novafora    Volunteers of 2100 [facility] Ex-Offender [employer] [phone]  Administrative www.yessy Chawla [place] [address] [phone]) [phone]  Service-Intake [facility] UnionptMyMichigan Medical Center SaultGlycominds. Galleon    [address] [place] [address] for Families, 59 [facility] for Pregnant Women 2-1-1 www.familyNorth Little RocktoSt. Mary's Medical Centero. [address] [place] [address] Drive for Youth [phone]  Main Number www. KanmuewedConfluence Life SciencesdsPurePhoto.  Kapow SoftwareSouth [facility]burg Hospital 77-75 Mortgage/Rent American [employer] for [employer] and their families [phone] http://co.casey. oh.us/   Ovarian Cancer Connection Rent Payment Assistance for Individuals with Ovarian Cancer    [phone]) [phone]  Service-Intake www.ovarianconnection. [facility] Kimberli 92 [phone]  Administrative www.saint-marciMissouri Baptist Hospital-Sullivan [address] for Individuals with Multiple Sclerosis [phone] ext: 1  Toll Free [facility].org/Chapters/OHA    [address] [phone]  [facility] - Altria Group. CCQDE975. org    Neighborhood Properties Homeless Permanent Support [phone]  Service-Intake www.neighborhoodproperties. org     1/30/20  TRANSPORTATION      RESOURCE SERVICE PHONE WEB   Ikanos Living, Osmajoentie 86 [phone] ext: 1415 [facility]. absolutecreativeliving.com   Energy Transfer Partners * Homeless People [phone]  [facility] www."PlayFab, Inc.".org/211   2184 [facility] Appointments Transportation Developmental Disabilities / Older Adults [phone]) [phone]  [facility] www. Dime1 [address] Programs [phone]  [facility] Hospital Drive Appointments Transportation [phone] 2837 [facility],Dr. [facility] A Senior Ride Programs [phone]) [phone]  [address]. org   Welch [employer]orporated [phone] 129 [facility]. [address] [place] [address] Senior Ride Programs [phone]  [facility]8 [facility] Disability Related Transportation / Medical Appointment Transportation [phone]  PRINCESS Service-Intake Nutrinia   CM  [address] Disability Related Transportation * Older Adults [phone] 4950 [facility] Transportation Expense Assistance ([phone]) [phone]  Ul. [address] 47 http://sahni.info/   215 [facility] Transportation [phone]) [phone] 18646 [facility] www. paola [facility] [address] Transportation * Breast Cancer [phone]) [phone]  Administrative www.ywcanwo. 500 [facility] [address] Transportation * Kidney Disease [phone]  Service-Intake - Patient Nader Garay 34 [phone] ext: 9352 [facility] Saskia. zen. Kala 104 Programs [phone]) [phone]  Service-Intake SeekConcerts.tn. 90 Endonovo TherapeuticsHaxtun Hospital District of Developmental Disabilities Disability Related Transportation [phone]) [phone]  Administrative office www.Applied MicroStructures. org   Black and White Paratransit Senior Ride Programs / Medical Appointment Transportation Older Adults [phone] [address] [place] [address], 3 Northeast   Patient [address] Transportation [phone]  [facility]LifeBrite [facility] [phone] http://co.casey. oh.us/      (Also on Facebook)   1212 [facility] Appointments Transportation * Ovarian Cancer [phone]  [address] 47 www. ovarianconnection. [facility] Ultramar 112 [phone]) [phone]  Service-Intake www.jenelle. Guardian Life Insurance [phone]  Brennon. Teressa Newman www.maumeeseniorcenter. com   [facility] Health Association [address] 24 021 24 14       Patient Education feet together. Do not let your hips roll back. Your legs should open up like a clamshell. 3. Hold for 6 seconds. 4. Slowly lower your knee back down. Rest for 10 seconds. 5. Repeat 8 to 12 times. Follow-up care is a key part of your treatment and safety. Be sure to make and go to all appointments, and call your doctor if you are having problems. It's also a good idea to know your test results and keep a list of the medicines you take. Where can you learn more? Go to https://Mediatonic Gamespepiceweb.Sunshine. org and sign in to your As Seen on TV account. Enter H326 in the ION Signature box to learn more about \"Hip Bursitis: Exercises. \"     If you do not have an account, please click on the \"Sign Up Now\" link. Current as of: November 16, 2020               Content Version: 12.9  © 2006-2021 Moodswing. Care instructions adapted under license by [facility] ([facility]). If you have questions about a medical condition or this instruction, always ask your healthcare professional. [place] [address] any warranty or liability for your use of this information. Patient Education        Hip Arthritis: Exercises  Introduction  Here are some examples of exercises for you to try. The exercises may be suggested for a condition or for rehabilitation. Start each exercise slowly. Ease off the exercises if you start to have pain. You will be told when to start these exercises and which ones will work best for you. How to do the exercises  Straight-leg raises to the outside   1. Lie on your side, with your affected hip on top. 2. Tighten the front thigh muscles of your top leg to keep your knee straight. 3. Keep your hip and your leg straight in line with the rest of your body, and keep your knee pointing forward. Do not drop your hip back. 4. Lift your top leg straight up toward the ceiling, about 12 inches off the floor. Hold for about 6 seconds, then slowly lower your leg.   5. Repeat 8 to if only one hip is sore. Standing quadriceps stretch   1. If you are not steady on your feet, hold on to a chair, counter, or wall. You can also lie on your stomach or your side to do this exercise. 2. Bend the knee of the leg you want to stretch, and reach behind you to grab the front of your foot or ankle with the hand on the same side. For example, if you are stretching your right leg, use your right hand. 3. Keeping your knees next to each other, pull your foot toward your buttock until you feel a gentle stretch across the front of your hip and down the front of your thigh. Your knee should be pointed directly to the ground, and not out to the side. 4. Hold the stretch for at least 15 to 30 seconds. 5. Repeat 2 to 4 times. 6. Switch legs and repeat steps 1 through 5, even if only one hip is sore. Hip rotator stretch   1. Lie on your back with both knees bent and your feet flat on the floor. 2. Put the ankle of your affected leg on your opposite thigh near your knee. 3. Use your hand to gently push your knee away from your body until you feel a gentle stretch around your hip. 4. Hold the stretch for 15 to 30 seconds. 5. Repeat 2 to 4 times. 6. Repeat steps 1 through 5, but this time use your hand to gently pull your knee toward your opposite shoulder. 7. Switch legs and repeat steps 1 through 6, even if only one hip is sore. Knee-to-chest   1. Lie on your back with your knees bent and your feet flat on the floor. 2. Bring your affected leg to your chest, keeping the other foot flat on the floor (or keeping the other leg straight, whichever feels better on your lower back). 3. Keep your lower back pressed to the floor. Hold for at least 15 to 30 seconds. 4. Relax, and lower the knee to the starting position. 5. Repeat 2 to 4 times. 6. Switch legs and repeat steps 1 through 5, even if only one hip is sore.   7. To get more stretch, put your other leg flat on the floor while pulling your knee to your chest.    Clamshell   1. Lie on your side, with your affected hip on top. Keep your feet and knees together and your knees bent. 2. Raise your top knee, but keep your feet together. Do not let your hips roll back. Your legs should open up like a clamshell. 3. Hold for 6 seconds. 4. Slowly lower your knee back down. Rest for 10 seconds. 5. Repeat 8 to 12 times. 6. Switch legs and repeat steps 1 through 5, even if only one hip is sore. Follow-up care is a key part of your treatment and safety. Be sure to make and go to all appointments, and call your doctor if you are having problems. It's also a good idea to know your test results and keep a list of the medicines you take. Where can you learn more? Go to https://ThirdLovejteb.LoggedIn. org and sign in to your Skyera account. Enter V697 in the ShrinkTheWeb box to learn more about \"Hip Arthritis: Exercises. \"     If you do not have an account, please click on the \"Sign Up Now\" link. Current as of: November 16, 2020               Content Version: 12.9  © 9551-9857 Healthwise, Incorporated. Care instructions adapted under license by [facility] ([facility]). If you have questions about a medical condition or this instruction, always ask your healthcare professional. [address] any warranty or liability for your use of this information.

## 2021-06-25 LAB
DIRECT EXAM: ABNORMAL
Lab: ABNORMAL
SPECIMEN DESCRIPTION: ABNORMAL

## 2022-03-24 ENCOUNTER — WEB ENCOUNTER (OUTPATIENT)
Dept: URBAN - METROPOLITAN AREA CLINIC 92 | Facility: CLINIC | Age: 67
End: 2022-03-24

## 2022-03-24 ENCOUNTER — OFFICE VISIT (OUTPATIENT)
Dept: URBAN - METROPOLITAN AREA CLINIC 92 | Facility: CLINIC | Age: 67
End: 2022-03-24
Payer: MEDICARE

## 2022-03-24 ENCOUNTER — DASHBOARD ENCOUNTERS (OUTPATIENT)
Age: 67
End: 2022-03-24

## 2022-03-24 VITALS
HEIGHT: 65 IN | WEIGHT: 202.4 LBS | HEART RATE: 77 BPM | BODY MASS INDEX: 33.72 KG/M2 | DIASTOLIC BLOOD PRESSURE: 86 MMHG | SYSTOLIC BLOOD PRESSURE: 118 MMHG | TEMPERATURE: 97 F

## 2022-03-24 DIAGNOSIS — D50.9 IRON DEFICIENCY ANEMIA, UNSPECIFIED IRON DEFICIENCY ANEMIA TYPE: ICD-10-CM

## 2022-03-24 DIAGNOSIS — K92.1 HEMATOCHEZIA: ICD-10-CM

## 2022-03-24 PROCEDURE — 99204 OFFICE O/P NEW MOD 45 MIN: CPT | Performed by: INTERNAL MEDICINE

## 2022-03-24 RX ORDER — SODIUM PICOSULFATE, MAGNESIUM OXIDE, AND ANHYDROUS CITRIC ACID 10; 3.5; 12 MG/160ML; G/160ML; G/160ML
160 ML LIQUID ORAL
Qty: 320 MILLILITER | Refills: 0 | OUTPATIENT
Start: 2022-03-24 | End: 2022-03-25

## 2022-03-24 NOTE — HPI-TODAY'S VISIT:
Pt with scant hematochezia x2 episodes 2 months ago. Bright red with dark stools. Mild constipation, chronically. Not tx. Deneis abd pain, wt loss or diarrhea. Dx with mild RHIANNON recently.   Colonoscopy 2.5 yrs ago with polyps.

## 2022-04-26 ENCOUNTER — WEB ENCOUNTER (OUTPATIENT)
Dept: URBAN - METROPOLITAN AREA SURGERY CENTER 16 | Facility: SURGERY CENTER | Age: 67
End: 2022-04-26

## 2022-05-02 ENCOUNTER — LAB OUTSIDE AN ENCOUNTER (OUTPATIENT)
Dept: URBAN - METROPOLITAN AREA CLINIC 5 | Facility: CLINIC | Age: 67
End: 2022-05-02

## 2022-05-02 ENCOUNTER — TELEPHONE ENCOUNTER (OUTPATIENT)
Dept: URBAN - METROPOLITAN AREA CLINIC 5 | Facility: CLINIC | Age: 67
End: 2022-05-02

## 2022-05-02 ENCOUNTER — OFFICE VISIT (OUTPATIENT)
Dept: URBAN - METROPOLITAN AREA SURGERY CENTER 16 | Facility: SURGERY CENTER | Age: 67
End: 2022-05-02
Payer: MEDICARE

## 2022-05-02 DIAGNOSIS — D12.3 ADENOMA OF TRANSVERSE COLON: ICD-10-CM

## 2022-05-02 DIAGNOSIS — D50.9 ANEMIA: ICD-10-CM

## 2022-05-02 DIAGNOSIS — K29.80 ACUTE DUODENITIS: ICD-10-CM

## 2022-05-02 DIAGNOSIS — K29.60 ADENOPAPILLOMATOSIS GASTRICA: ICD-10-CM

## 2022-05-02 DIAGNOSIS — B96.81 BACTERIAL INFECTION DUE TO H. PYLORI: ICD-10-CM

## 2022-05-02 PROCEDURE — 45380 COLONOSCOPY AND BIOPSY: CPT | Performed by: INTERNAL MEDICINE

## 2022-05-02 PROCEDURE — G8907 PT DOC NO EVENTS ON DISCHARG: HCPCS | Performed by: INTERNAL MEDICINE

## 2022-05-02 PROCEDURE — 43239 EGD BIOPSY SINGLE/MULTIPLE: CPT | Performed by: INTERNAL MEDICINE

## 2022-05-05 PROBLEM — 87522002: Status: ACTIVE | Noted: 2022-03-24

## 2022-05-13 ENCOUNTER — TELEPHONE ENCOUNTER (OUTPATIENT)
Dept: URBAN - METROPOLITAN AREA CLINIC 5 | Facility: CLINIC | Age: 67
End: 2022-05-13

## 2022-05-13 PROBLEM — 89538001: Status: ACTIVE | Noted: 2022-05-13

## 2022-05-13 RX ORDER — AMOXICILLIN 500 MG/1
2 CAPSULES CAPSULE ORAL TWICE A DAY
Qty: 56 CAPSULE | Refills: 0 | OUTPATIENT
Start: 2022-05-13 | End: 2022-05-27

## 2022-05-13 RX ORDER — CLARITHROMYCIN 500 MG/1
1 TABLET TABLET, FILM COATED ORAL
Qty: 28 TABLET | Refills: 0 | OUTPATIENT
Start: 2022-05-13 | End: 2022-05-27

## 2022-05-13 RX ORDER — OMEPRAZOLE 40 MG/1
AS DIRECTED CAPSULE, DELAYED RELEASE ORAL TWICE DAILY
Qty: 28 | Refills: 0 | OUTPATIENT
Start: 2022-05-13

## 2022-06-17 ENCOUNTER — OFFICE VISIT (OUTPATIENT)
Dept: URBAN - METROPOLITAN AREA CLINIC 91 | Facility: CLINIC | Age: 67
End: 2022-06-17

## 2022-06-20 ENCOUNTER — TELEPHONE (OUTPATIENT)
Dept: FAMILY MEDICINE CLINIC | Age: 67
End: 2022-06-20

## 2022-06-20 PROBLEM — 724556004: Status: ACTIVE | Noted: 2022-05-02

## 2022-06-20 NOTE — TELEPHONE ENCOUNTER
David Nicole was contacted as part of mammography outreach. Patient declined to schedule an appointment at this time.     Gabe

## 2022-06-29 ENCOUNTER — OFFICE VISIT (OUTPATIENT)
Dept: URBAN - METROPOLITAN AREA CLINIC 91 | Facility: CLINIC | Age: 67
End: 2022-06-29
Payer: MEDICARE

## 2022-06-29 DIAGNOSIS — D50.9 ANEMIA: ICD-10-CM

## 2022-06-29 PROCEDURE — 91110 GI TRC IMG INTRAL ESOPH-ILE: CPT | Performed by: INTERNAL MEDICINE

## 2022-07-08 ENCOUNTER — TELEPHONE ENCOUNTER (OUTPATIENT)
Dept: URBAN - METROPOLITAN AREA CLINIC 92 | Facility: CLINIC | Age: 67
End: 2022-07-08

## 2024-12-06 ENCOUNTER — HOSPITAL ENCOUNTER (EMERGENCY)
Facility: HOSPITAL | Age: 69
End: 2024-12-07
Attending: EMERGENCY MEDICINE
Payer: MEDICARE

## 2024-12-06 ENCOUNTER — APPOINTMENT (EMERGENCY)
Dept: CT IMAGING | Facility: HOSPITAL | Age: 69
End: 2024-12-06
Payer: MEDICARE

## 2024-12-06 DIAGNOSIS — J39.2 EDEMA OF THROAT: ICD-10-CM

## 2024-12-06 DIAGNOSIS — J98.8 COMPROMISED AIRWAY: Primary | ICD-10-CM

## 2024-12-06 DIAGNOSIS — Z98.890 HISTORY OF RECENT DENTAL PROCEDURE: ICD-10-CM

## 2024-12-06 PROBLEM — D72.829 LEUKOCYTOSIS: Status: ACTIVE | Noted: 2024-12-06

## 2024-12-06 LAB
ALBUMIN SERPL BCG-MCNC: 3.9 G/DL (ref 3.5–5)
ALP SERPL-CCNC: 79 U/L (ref 34–104)
ALT SERPL W P-5'-P-CCNC: 9 U/L (ref 7–52)
ANION GAP SERPL CALCULATED.3IONS-SCNC: 5 MMOL/L (ref 4–13)
AST SERPL W P-5'-P-CCNC: 12 U/L (ref 13–39)
BASOPHILS # BLD AUTO: 0.02 THOUSANDS/ÂΜL (ref 0–0.1)
BASOPHILS NFR BLD AUTO: 0 % (ref 0–1)
BILIRUB SERPL-MCNC: 0.25 MG/DL (ref 0.2–1)
BUN SERPL-MCNC: 15 MG/DL (ref 5–25)
CALCIUM SERPL-MCNC: 9 MG/DL (ref 8.4–10.2)
CHLORIDE SERPL-SCNC: 107 MMOL/L (ref 96–108)
CO2 SERPL-SCNC: 26 MMOL/L (ref 21–32)
CREAT SERPL-MCNC: 0.69 MG/DL (ref 0.6–1.3)
EOSINOPHIL # BLD AUTO: 0.16 THOUSAND/ÂΜL (ref 0–0.61)
EOSINOPHIL NFR BLD AUTO: 1 % (ref 0–6)
ERYTHROCYTE [DISTWIDTH] IN BLOOD BY AUTOMATED COUNT: 16.7 % (ref 11.6–15.1)
GFR SERPL CREATININE-BSD FRML MDRD: 90 ML/MIN/1.73SQ M
GLUCOSE SERPL-MCNC: 114 MG/DL (ref 65–140)
HCT VFR BLD AUTO: 35.7 % (ref 34.8–46.1)
HGB BLD-MCNC: 11.3 G/DL (ref 11.5–15.4)
IMM GRANULOCYTES # BLD AUTO: 0.03 THOUSAND/UL (ref 0–0.2)
IMM GRANULOCYTES NFR BLD AUTO: 0 % (ref 0–2)
LYMPHOCYTES # BLD AUTO: 2.07 THOUSANDS/ÂΜL (ref 0.6–4.47)
LYMPHOCYTES NFR BLD AUTO: 18 % (ref 14–44)
MCH RBC QN AUTO: 22.4 PG (ref 26.8–34.3)
MCHC RBC AUTO-ENTMCNC: 31.7 G/DL (ref 31.4–37.4)
MCV RBC AUTO: 71 FL (ref 82–98)
MONOCYTES # BLD AUTO: 1.23 THOUSAND/ÂΜL (ref 0.17–1.22)
MONOCYTES NFR BLD AUTO: 11 % (ref 4–12)
NEUTROPHILS # BLD AUTO: 7.83 THOUSANDS/ÂΜL (ref 1.85–7.62)
NEUTS SEG NFR BLD AUTO: 70 % (ref 43–75)
NRBC BLD AUTO-RTO: 0 /100 WBCS
PLATELET # BLD AUTO: 357 THOUSANDS/UL (ref 149–390)
PMV BLD AUTO: 10.2 FL (ref 8.9–12.7)
POTASSIUM SERPL-SCNC: 4 MMOL/L (ref 3.5–5.3)
PROT SERPL-MCNC: 6.9 G/DL (ref 6.4–8.4)
RBC # BLD AUTO: 5.04 MILLION/UL (ref 3.81–5.12)
SODIUM SERPL-SCNC: 138 MMOL/L (ref 135–147)
WBC # BLD AUTO: 11.34 THOUSAND/UL (ref 4.31–10.16)

## 2024-12-06 PROCEDURE — 96365 THER/PROPH/DIAG IV INF INIT: CPT

## 2024-12-06 PROCEDURE — 70490 CT SOFT TISSUE NECK W/O DYE: CPT

## 2024-12-06 PROCEDURE — 85025 COMPLETE CBC W/AUTO DIFF WBC: CPT

## 2024-12-06 PROCEDURE — 99285 EMERGENCY DEPT VISIT HI MDM: CPT

## 2024-12-06 PROCEDURE — 96375 TX/PRO/DX INJ NEW DRUG ADDON: CPT

## 2024-12-06 PROCEDURE — 36415 COLL VENOUS BLD VENIPUNCTURE: CPT

## 2024-12-06 PROCEDURE — 80053 COMPREHEN METABOLIC PANEL: CPT

## 2024-12-06 RX ORDER — KETOROLAC TROMETHAMINE 30 MG/ML
15 INJECTION, SOLUTION INTRAMUSCULAR; INTRAVENOUS ONCE
Status: COMPLETED | OUTPATIENT
Start: 2024-12-06 | End: 2024-12-06

## 2024-12-06 RX ORDER — DEXAMETHASONE SODIUM PHOSPHATE 10 MG/ML
10 INJECTION, SOLUTION INTRAMUSCULAR; INTRAVENOUS ONCE
Status: COMPLETED | OUTPATIENT
Start: 2024-12-06 | End: 2024-12-06

## 2024-12-06 RX ADMIN — DEXAMETHASONE SODIUM PHOSPHATE 10 MG: 10 INJECTION, SOLUTION INTRAMUSCULAR; INTRAVENOUS at 21:18

## 2024-12-06 RX ADMIN — KETOROLAC TROMETHAMINE 15 MG: 30 INJECTION, SOLUTION INTRAMUSCULAR at 21:18

## 2024-12-06 RX ADMIN — SODIUM CHLORIDE 1000 ML: 0.9 INJECTION, SOLUTION INTRAVENOUS at 22:43

## 2024-12-06 RX ADMIN — AMPICILLIN SODIUM AND SULBACTAM SODIUM 3 G: 100; 50 INJECTION, POWDER, FOR SOLUTION INTRAVENOUS at 22:42

## 2024-12-07 ENCOUNTER — HOSPITAL ENCOUNTER (INPATIENT)
Facility: HOSPITAL | Age: 69
LOS: 1 days | Discharge: HOME | DRG: 863 | End: 2024-12-08
Attending: INTERNAL MEDICINE | Admitting: INTERNAL MEDICINE
Payer: MEDICARE

## 2024-12-07 VITALS
TEMPERATURE: 99.3 F | RESPIRATION RATE: 20 BRPM | OXYGEN SATURATION: 95 % | HEIGHT: 65 IN | DIASTOLIC BLOOD PRESSURE: 80 MMHG | BODY MASS INDEX: 32.29 KG/M2 | SYSTOLIC BLOOD PRESSURE: 165 MMHG | WEIGHT: 193.78 LBS | HEART RATE: 86 BPM

## 2024-12-07 DIAGNOSIS — D72.829 LEUKOCYTOSIS: ICD-10-CM

## 2024-12-07 DIAGNOSIS — J98.8 NARROWING OF AIRWAY: Primary | ICD-10-CM

## 2024-12-07 PROBLEM — I10 HTN (HYPERTENSION): Status: ACTIVE | Noted: 2024-12-07

## 2024-12-07 LAB
ANION GAP SERPL CALCULATED.3IONS-SCNC: 7 MMOL/L (ref 4–13)
ANISOCYTOSIS BLD QL SMEAR: PRESENT
BASOPHILS # BLD MANUAL: 0 THOUSAND/UL (ref 0–0.1)
BASOPHILS NFR MAR MANUAL: 0 % (ref 0–1)
BUN SERPL-MCNC: 13 MG/DL (ref 5–25)
CA-I BLD-SCNC: 1.13 MMOL/L (ref 1.12–1.32)
CALCIUM SERPL-MCNC: 8.8 MG/DL (ref 8.4–10.2)
CHLORIDE SERPL-SCNC: 109 MMOL/L (ref 96–108)
CO2 SERPL-SCNC: 23 MMOL/L (ref 21–32)
CREAT SERPL-MCNC: 0.56 MG/DL (ref 0.6–1.3)
EOSINOPHIL # BLD MANUAL: 0.1 THOUSAND/UL (ref 0–0.4)
EOSINOPHIL NFR BLD MANUAL: 1 % (ref 0–6)
ERYTHROCYTE [DISTWIDTH] IN BLOOD BY AUTOMATED COUNT: 16.8 % (ref 11.6–15.1)
EST. AVERAGE GLUCOSE BLD GHB EST-MCNC: 126 MG/DL
GFR SERPL CREATININE-BSD FRML MDRD: 96 ML/MIN/1.73SQ M
GLUCOSE SERPL-MCNC: 122 MG/DL (ref 65–140)
GLUCOSE SERPL-MCNC: 162 MG/DL (ref 65–140)
GLUCOSE SERPL-MCNC: 164 MG/DL (ref 65–140)
GLUCOSE SERPL-MCNC: 207 MG/DL (ref 65–140)
HBA1C MFR BLD: 6 %
HCT VFR BLD AUTO: 35.1 % (ref 34.8–46.1)
HGB BLD-MCNC: 11.5 G/DL (ref 11.5–15.4)
LYMPHOCYTES # BLD AUTO: 0.92 THOUSAND/UL (ref 0.6–4.47)
LYMPHOCYTES # BLD AUTO: 7 % (ref 14–44)
MAGNESIUM SERPL-MCNC: 1.9 MG/DL (ref 1.9–2.7)
MCH RBC QN AUTO: 23 PG (ref 26.8–34.3)
MCHC RBC AUTO-ENTMCNC: 32.8 G/DL (ref 31.4–37.4)
MCV RBC AUTO: 70 FL (ref 82–98)
MICROCYTES BLD QL AUTO: PRESENT
MONOCYTES # BLD AUTO: 0 THOUSAND/UL (ref 0–1.22)
MONOCYTES NFR BLD: 0 % (ref 4–12)
NEUTROPHILS # BLD MANUAL: 9.16 THOUSAND/UL (ref 1.85–7.62)
NEUTS BAND NFR BLD MANUAL: 2 % (ref 0–8)
NEUTS SEG NFR BLD AUTO: 88 % (ref 43–75)
OVALOCYTES BLD QL SMEAR: PRESENT
PLATELET # BLD AUTO: 362 THOUSANDS/UL (ref 149–390)
PLATELET BLD QL SMEAR: ADEQUATE
PMV BLD AUTO: 10.7 FL (ref 8.9–12.7)
POIKILOCYTOSIS BLD QL SMEAR: PRESENT
POTASSIUM SERPL-SCNC: 4.1 MMOL/L (ref 3.5–5.3)
RBC # BLD AUTO: 4.99 MILLION/UL (ref 3.81–5.12)
RBC MORPH BLD: PRESENT
SODIUM SERPL-SCNC: 139 MMOL/L (ref 135–147)
VARIANT LYMPHS # BLD AUTO: 2 %
WBC # BLD AUTO: 10.18 THOUSAND/UL (ref 4.31–10.16)

## 2024-12-07 PROCEDURE — 80048 BASIC METABOLIC PNL TOTAL CA: CPT

## 2024-12-07 PROCEDURE — 99223 1ST HOSP IP/OBS HIGH 75: CPT | Performed by: INTERNAL MEDICINE

## 2024-12-07 PROCEDURE — 83735 ASSAY OF MAGNESIUM: CPT

## 2024-12-07 PROCEDURE — NC001 PR NO CHARGE: Performed by: INTERNAL MEDICINE

## 2024-12-07 PROCEDURE — 85027 COMPLETE CBC AUTOMATED: CPT

## 2024-12-07 PROCEDURE — 31575 DIAGNOSTIC LARYNGOSCOPY: CPT | Performed by: OTOLARYNGOLOGY

## 2024-12-07 PROCEDURE — 82948 REAGENT STRIP/BLOOD GLUCOSE: CPT

## 2024-12-07 PROCEDURE — 83036 HEMOGLOBIN GLYCOSYLATED A1C: CPT | Performed by: INTERNAL MEDICINE

## 2024-12-07 PROCEDURE — 82330 ASSAY OF CALCIUM: CPT

## 2024-12-07 PROCEDURE — 99221 1ST HOSP IP/OBS SF/LOW 40: CPT | Performed by: OTOLARYNGOLOGY

## 2024-12-07 PROCEDURE — 85007 BL SMEAR W/DIFF WBC COUNT: CPT

## 2024-12-07 RX ORDER — HYDRALAZINE HYDROCHLORIDE 20 MG/ML
10 INJECTION INTRAMUSCULAR; INTRAVENOUS ONCE
Status: COMPLETED | OUTPATIENT
Start: 2024-12-07 | End: 2024-12-07

## 2024-12-07 RX ORDER — METHYLPREDNISOLONE SODIUM SUCCINATE 40 MG/ML
40 INJECTION, POWDER, LYOPHILIZED, FOR SOLUTION INTRAMUSCULAR; INTRAVENOUS EVERY 8 HOURS SCHEDULED
Status: DISCONTINUED | OUTPATIENT
Start: 2024-12-07 | End: 2024-12-07

## 2024-12-07 RX ORDER — MAGNESIUM SULFATE HEPTAHYDRATE 40 MG/ML
2 INJECTION, SOLUTION INTRAVENOUS ONCE
Status: COMPLETED | OUTPATIENT
Start: 2024-12-07 | End: 2024-12-07

## 2024-12-07 RX ORDER — ENOXAPARIN SODIUM 100 MG/ML
40 INJECTION SUBCUTANEOUS DAILY
Status: DISCONTINUED | OUTPATIENT
Start: 2024-12-07 | End: 2024-12-08 | Stop reason: HOSPADM

## 2024-12-07 RX ORDER — FAMOTIDINE 10 MG/ML
20 INJECTION, SOLUTION INTRAVENOUS EVERY 12 HOURS SCHEDULED
Status: DISCONTINUED | OUTPATIENT
Start: 2024-12-07 | End: 2024-12-08 | Stop reason: HOSPADM

## 2024-12-07 RX ORDER — DIPHENHYDRAMINE HYDROCHLORIDE 50 MG/ML
25 INJECTION INTRAMUSCULAR; INTRAVENOUS EVERY 6 HOURS PRN
Status: DISCONTINUED | OUTPATIENT
Start: 2024-12-07 | End: 2024-12-08 | Stop reason: HOSPADM

## 2024-12-07 RX ORDER — DEXAMETHASONE SODIUM PHOSPHATE 4 MG/ML
2 INJECTION, SOLUTION INTRA-ARTICULAR; INTRALESIONAL; INTRAMUSCULAR; INTRAVENOUS; SOFT TISSUE EVERY 12 HOURS SCHEDULED
Status: COMPLETED | OUTPATIENT
Start: 2024-12-07 | End: 2024-12-07

## 2024-12-07 RX ORDER — INSULIN LISPRO 100 [IU]/ML
1-6 INJECTION, SOLUTION INTRAVENOUS; SUBCUTANEOUS
Status: DISCONTINUED | OUTPATIENT
Start: 2024-12-07 | End: 2024-12-08 | Stop reason: HOSPADM

## 2024-12-07 RX ORDER — CHLORHEXIDINE GLUCONATE ORAL RINSE 1.2 MG/ML
15 SOLUTION DENTAL EVERY 12 HOURS SCHEDULED
Status: DISCONTINUED | OUTPATIENT
Start: 2024-12-07 | End: 2024-12-07

## 2024-12-07 RX ADMIN — HYDRALAZINE HYDROCHLORIDE 10 MG: 20 INJECTION INTRAMUSCULAR; INTRAVENOUS at 01:44

## 2024-12-07 RX ADMIN — DEXAMETHASONE SODIUM PHOSPHATE 2 MG: 4 INJECTION INTRA-ARTICULAR; INTRALESIONAL; INTRAMUSCULAR; INTRAVENOUS; SOFT TISSUE at 11:31

## 2024-12-07 RX ADMIN — DEXAMETHASONE SODIUM PHOSPHATE 2 MG: 4 INJECTION INTRA-ARTICULAR; INTRALESIONAL; INTRAMUSCULAR; INTRAVENOUS; SOFT TISSUE at 22:49

## 2024-12-07 RX ADMIN — PIPERACILLIN AND TAZOBACTAM 4.5 G: 36; 4.5 INJECTION, POWDER, FOR SOLUTION INTRAVENOUS at 01:58

## 2024-12-07 RX ADMIN — CHLORHEXIDINE GLUCONATE 15 ML: 1.2 RINSE ORAL at 08:22

## 2024-12-07 RX ADMIN — FAMOTIDINE 20 MG: 10 INJECTION INTRAVENOUS at 11:31

## 2024-12-07 RX ADMIN — METHYLPREDNISOLONE SODIUM SUCCINATE 40 MG: 40 INJECTION, POWDER, FOR SOLUTION INTRAMUSCULAR; INTRAVENOUS at 05:51

## 2024-12-07 RX ADMIN — FAMOTIDINE 20 MG: 10 INJECTION INTRAVENOUS at 22:49

## 2024-12-07 RX ADMIN — INSULIN LISPRO 2 UNITS: 100 INJECTION, SOLUTION INTRAVENOUS; SUBCUTANEOUS at 12:00

## 2024-12-07 RX ADMIN — MAGNESIUM SULFATE HEPTAHYDRATE 2 G: 40 INJECTION, SOLUTION INTRAVENOUS at 06:28

## 2024-12-07 RX ADMIN — METHYLPREDNISOLONE SODIUM SUCCINATE 40 MG: 40 INJECTION, POWDER, FOR SOLUTION INTRAMUSCULAR; INTRAVENOUS at 01:45

## 2024-12-07 RX ADMIN — AMPICILLIN SODIUM AND SULBACTAM SODIUM 3 G: 100; 50 INJECTION, POWDER, FOR SOLUTION INTRAVENOUS at 11:30

## 2024-12-07 RX ADMIN — PIPERACILLIN AND TAZOBACTAM 4.5 G: 36; 4.5 INJECTION, POWDER, FOR SOLUTION INTRAVENOUS at 05:51

## 2024-12-07 RX ADMIN — AMPICILLIN SODIUM AND SULBACTAM SODIUM 3 G: 100; 50 INJECTION, POWDER, FOR SOLUTION INTRAVENOUS at 22:56

## 2024-12-07 RX ADMIN — ENOXAPARIN SODIUM 40 MG: 40 INJECTION SUBCUTANEOUS at 08:22

## 2024-12-07 RX ADMIN — CHLORHEXIDINE GLUCONATE 15 ML: 1.2 RINSE ORAL at 01:44

## 2024-12-07 RX ADMIN — AMPICILLIN SODIUM AND SULBACTAM SODIUM 3 G: 100; 50 INJECTION, POWDER, FOR SOLUTION INTRAVENOUS at 17:44

## 2024-12-07 NOTE — CASE MANAGEMENT
Case Management Assessment    Patient name Pina Horton  Location S /S -01 MRN [id number]  : 1955 Date 2024       Current Admission Date: 2024  Current Admission Diagnosis:Narrowing of airway   Patient Active Problem List    Diagnosis Date Noted Date Diagnosed    HTN (hypertension) 2024     Narrowing of airway 2024     Leukocytosis 2024       LOS (days): 0  Geometric Mean LOS (GMLOS) (days):   Days to GMLOS:     OBJECTIVE:    Risk of Unplanned Readmission Score: 5.27         Current admission status: Inpatient       Preferred Pharmacy:   PATIENT/FAMILY REPORTS NO PREFERRED PHARMACY  No address on file      Primary Care Provider: No primary care provider on file.    Primary Insurance: MEDICARE  Secondary Insurance:     ASSESSMENT:  Active Health Care Proxies    There are no active Health Care Proxies on file.                      Patient Information  Admitted from:: Home  Mental Status: Alert  During Assessment patient was accompanied by: Not accompanied during assessment  Assessment information provided by:: Patient  Primary Caregiver: Self  Support Systems: Family members  Home entry access options. Select all that apply.: No steps to enter home  Type of Current Residence: [facility]  Living Arrangements: Other (Comment) (ex-)    Activities of Daily Living Prior to Admission  Functional Status: Independent  Completes ADLs independently?: Yes  Ambulates independently?: Yes  Does patient use assisted devices?: No  Does patient currently own DME?: No  Does patient have a history of Outpatient Therapy (PT/OT)?: No  Does the patient have a history of Short-Term Rehab?: No  Does patient have a history of HHC?: No  Does patient currently have HHC?: No         Patient Information Continued  Does patient have prescription coverage?: Yes  Does patient receive dialysis treatments?: No         Means of Transportation  Means of Transport to Appts:: Drives Self    CM met  with patient at bedside re: consult received for dispo planning. Per patient she is currently in the area on vacation and lives in [place]. Assessment completed with patient. Pt relayed she lives with her ex- in ranch home, no SRAVAN. Pt relayed she is independent with ADLS, does not use nor own any DME, has no hx of PT/OT, and drives self. Pt relayed her local pharmacy is [facility] in [place], [place]. Patient agreeable to \[facility]\"" pharmacy Aram in case any medications needed upon d/c from current hospital admission. No CM d/c needs currently identified, CM remains available.     Social Determinants of Health (SDOH)      Flowsheet Row Most Recent Value   Housing Stability    In the last 12 months, was there a time when you were not able to pay the mortgage or rent on time? N   At any time in the past 12 months, were you homeless or living in a shelter (including now)? N   Transportation Needs    In the past 12 months, has lack of transportation kept you from medical appointments or from getting medications? no   In the past 12 months, has lack of transportation kept you from meetings, work, or from getting things needed for daily living? No   Food Insecurity    Within the past 12 months, you worried that your food would run out before you got the money to buy more. Never true   Within the past 12 months, the food you bought just didn't last and you didn't have money to get more. Never true   Utilities    In the past 12 months has the electric, gas, oil, or water company threatened to shut off services in your home? No

## 2024-12-07 NOTE — CONSULTS
Oral and Maxillofacial Surgery Consult    Assessment  69 y.o. female  evaluated for airway edema, deviation and inability to tolerate secretions 2/2 to dental procedure on Monday, 12/2. On bedside dental exam, there is no vestibular swelling of max/britni, no FOM swelling, tenderness, or firmness to indicate an odontogenic source. CT face w/o contrast shows no signs of odontogenic infection within the maxilla or mandible; limited soft tissue evaluation due to lack of contrast. Pt reports significant improvement in swelling, pain and dysphagia since starting abx and steroids.     Plan:  - Analgesia as per primary: Consider Motrin 400-600mg q6h & Tylenol 500mg q6h x 2d then as needed. Oxycodone 5mg q4h as needed for breakthrough pain  - Abx as per primary team  - Encourage good oral hygiene  - DVT ppx as per primary team  - HOB  - Yankaur suction at bedside  - No OMFS intervention or f/u indicated at this time due to unlikely odontogenic source.      D/w OMFS attg on call      Consults     HPI: 69 y.o. female w PMH HTN. Pt currently admitted for IV abx and steroids in the setting of R neck swelling, dysphagia, leukocytosis after patient had dental crown on tooth #3 placed on Monday, 12/2. Pt denies any dental pain. Denies fever, chills, nausea, odynophagia, dysphagia, dyspnea, shortness of breath at this time; says she has noticed considerable improvement in swelling, pain and ability to swallow since starting steroids and abx.     PMH:   No past medical history on file.     Allergies:   Allergies   Allergen Reactions    Iodine - Food Allergy Anaphylaxis       Meds:     Current Facility-Administered Medications:     ampicillin-sulbactam (UNASYN) 3 g in sodium chloride 0.9 % 100 mL IVPB, 3 g, Intravenous, Q6H, Aashish Elliott DO, Last Rate: 200 mL/hr at 12/07/24 1130, 3 g at 12/07/24 1130    dexamethasone (DECADRON) injection 2 mg, 2 mg, Intravenous, Q12H NAGA, Aashish Elliott DO, 2 mg at 12/07/24 1131    diphenhydrAMINE  (BENADRYL) injection 25 mg, 25 mg, Intravenous, Q6H PRN, Aashish Elliott DO    enoxaparin (LOVENOX) subcutaneous injection 40 mg, 40 mg, Subcutaneous, Daily, Aashish Elliott DO, 40 mg at 12/07/24 0822    Famotidine (PF) (PEPCID) injection 20 mg, 20 mg, Intravenous, Q12H NAGA, Aashish Elliott DO, 20 mg at 12/07/24 1131    insulin lispro (HumALOG/ADMELOG) 100 units/mL subcutaneous injection 1-6 Units, 1-6 Units, Subcutaneous, TID AC, 2 Units at 12/07/24 1200 **AND** Fingerstick Glucose (POCT), , , TID AC, Aashish Elliott DO    PSH:   No past surgical history on file.   No family history on file.         Temp:  [98.5 °F (36.9 °C)-99.3 °F (37.4 °C)] 98.5 °F (36.9 °C)  HR:  [75-95] 85  Resp:  [12-28] 28  BP: (133-175)/() 171/106  SpO2:  [93 %-98 %] 96 %       Intake/Output Summary (Last 24 hours) at 12/7/2024 1500  Last data filed at 12/7/2024 0400  Gross per 24 hour   Intake 100 ml   Output --   Net 100 ml        Physical Exam:  GE: AAOx3. NAD.  HEENT:    Head: No facial swelling. No trismus. No tenderness to palpation. No LAD. Inferior border of the mandible is palpable.  Mouth: ~35mm DEANDRE. Dentition grossly intact. No vestibular swelling. No purulence. No sinus tract. FOM soft/non-tender/non-elevated. Uvula midline.   Resp: no respiratory distress on RA  CVS: RRR      Lab Results: CBC:   Lab Results   Component Value Date    WBC 10.18 (H) 12/07/2024    HGB 11.5 12/07/2024    HCT 35.1 12/07/2024    MCV 70 (L) 12/07/2024     12/07/2024    RBC 4.99 12/07/2024    MCH 23.0 (L) 12/07/2024    MCHC 32.8 12/07/2024    RDW 16.8 (H) 12/07/2024    MPV 10.7 12/07/2024    NRBC 0 12/06/2024   , CMP:   Lab Results   Component Value Date    SODIUM 139 12/07/2024    K 4.1 12/07/2024     (H) 12/07/2024    CO2 23 12/07/2024    BUN 13 12/07/2024    CREATININE 0.56 (L) 12/07/2024    CALCIUM 8.8 12/07/2024    AST 12 (L) 12/06/2024    ALT 9 12/06/2024    ALKPHOS 79 12/06/2024    EGFR 96 12/07/2024   , Coagulation: No results  "found for: \"PT\", \"INR\", \"APTT\"  Imaging: Results Review Statement: I reviewed radiology reports from this admission including: CT facial bones.    Counseling / Coordination of Care  Total floor / unit time spent today 30 minutes.  Greater than 50% of total time was spent with the patient and / or family counseling and / or coordination of care.       Please call or page OMFS resident on call after hours.   "

## 2024-12-07 NOTE — ED PROVIDER NOTES
Time reflects when diagnosis was documented in both MDM as applicable and the Disposition within this note       Time User Action Codes Description Comment    12/6/2024 10:37 PM Bianca Welch [J98.8] Compromised airway     12/6/2024 10:38 PM Bianca Welch [J39.2] Edema of throat     12/6/2024 10:38 PM Bianca Welch [Z98.890] History of recent dental procedure           ED Disposition       ED Disposition   Transfer to Another Facility-In Network    Condition   --    Date/Time   Fri Dec 6, 2024 10:57 PM    Comment   Pina Horton should be transferred out to [facility].               Assessment & Plan       Medical Decision Making  Vital signs stable.  Leukocytosis, 11.34.  Otherwise, labs stable. CT scan performed without contrast due to concern for anaphylaxis allergy.  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 Due to patient's symptoms and CT results, discussed with ENT, OMFS.  Per ENT, recommend transfer to stepdown bed at [facility] for monitoring.  Antibiotics, steroids, fluids and pain medication given in the ER.  Discussed ED course and results with patient.  Patient understands and consents to transfer.    Amount and/or Complexity of Data Reviewed  Labs: ordered. Decision-making details documented in ED Course.  Radiology: ordered.    Risk  Prescription drug management.        ED Course as of 12/07/24 0444   Fri Dec 06, 2024   2123 WBC(!): 11.34   2123 Hemoglobin(!): 11.3       Medications   dexamethasone (PF) (DECADRON) injection 10 mg (10 mg Intravenous Given 12/6/24 2118)   ketorolac (TORADOL) injection 15 mg (15 mg Intravenous Given 12/6/24 2118)   ampicillin-sulbactam (UNASYN) 3 g in sodium chloride 0.9 % 100 mL IVPB (0 g Intravenous Stopped 12/7/24 0010)   sodium chloride 0.9 % bolus 1,000 mL (0 mL Intravenous Stopped 12/7/24 0010)       ED Risk Strat Scores                   Identification of Seniors at Risk      Flowsheet Row  Most Recent Value   (ISAR) Identification of Seniors at Risk    Before the illness or injury that brought you to the Emergency, did you need someone to help you on a regular basis? 0 Filed at: 12/06/2024 2055   In the last 24 hours, have you needed more help than usual? 0 Filed at: 12/06/2024 2055   Have you been hospitalized for one or more nights during the past 6 months? 0 Filed at: 12/06/2024 2055   In general, do you see well? 0 Filed at: 12/06/2024 2055   In general, do you have serious problems with your memory? 0 Filed at: 12/06/2024 2055   Do you take more than three different medications every day? 0 Filed at: 12/06/2024 2055   ISAR Score 0 Filed at: 12/06/2024 2055                SBIRT 20yo+      Flowsheet Row Most Recent Value   Initial Alcohol Screen: US AUDIT-C     1. How often do you have a drink containing alcohol? 1 Filed at: 12/06/2024 2056   2. How many drinks containing alcohol do you have on a typical day you are drinking?  1 Filed at: 12/06/2024 2056   3b. FEMALE Any Age, or MALE 65+: How often do you have 4 or more drinks on one occassion? 1 Filed at: 12/06/2024 2056   Audit-C Score 3 Filed at: 12/06/2024 2056   UVALDO: How many times in the past year have you...    Used an illegal drug or used a prescription medication for non-medical reasons? Never Filed at: 12/06/2024 2056                            History of Present Illness       Chief Complaint   Patient presents with    Dental Pain     Patient co lower right dental pain that started after having a cap placed on her tooth on Monday. Patient reports since she has been having swelling and trouble with her throat. Patient reports her dentist told her to go to the ER.        No past medical history on file.   No past surgical history on file.   No family history on file.   Social History     Tobacco Use    Smoking status: Every Day     Types: Cigarettes    Smokeless tobacco: Never   Substance Use Topics    Alcohol use: Yes     Comment: social     Drug use: Never      E-Cigarette/Vaping      E-Cigarette/Vaping Substances      I have reviewed and agree with the history as documented.     Patient is a 69-year-old female who presents to the emergency room for dental pain. [place] right sided upper teeth. She had Caps put on. She is visiting from out of state for work. Reports since Monday evening right-sided throat swelling, difficulty swallowing, voice change. Symptoms have worsened since Monday night.  Denies any other symptoms.  Patient reports she called her dentist who advised her to seek care in the ER.          Review of Systems   Constitutional:  Negative for chills and fever.   HENT:  Positive for dental problem, sore throat, trouble swallowing and voice change. Negative for ear pain.    Eyes:  Negative for pain and visual disturbance.   Respiratory:  Negative for cough and shortness of breath.    Cardiovascular:  Negative for chest pain and palpitations.   Gastrointestinal:  Negative for abdominal pain, nausea and vomiting.   Genitourinary:  Negative for dysuria and hematuria.   Musculoskeletal:  Positive for neck pain. Negative for arthralgias and back pain.   Skin:  Negative for color change and rash.   Neurological:  Negative for seizures, syncope and headaches.   Psychiatric/Behavioral:  Negative for confusion.    All other systems reviewed and are negative.          Objective       ED Triage Vitals [12/06/24 2052]   Temperature Pulse Blood Pressure Respirations SpO2 Patient Position - Orthostatic VS   99.3 °F (37.4 °C) 95 (!) 175/102 18 98 % Sitting      Temp Source Heart Rate Source BP Location FiO2 (%) Pain Score    Oral Monitor Left arm -- --      Vitals      Date and Time Temp Pulse SpO2 Resp BP Pain Score FACES Pain Rating User   12/07/24 0000 -- 86 95 % 20 -- -- -- AM   12/06/24 2343 -- 77 97 % 18 165/80 -- -- AM   12/06/24 2052 99.3 °F (37.4 °C) 95 98 % 18 175/102 -- -- LA             Physical Exam  Vitals and nursing note reviewed.   Constitutional:       General: She is not in acute distress.     Appearance: She is well-developed.   HENT:      Head: Normocephalic and atraumatic.      Nose: Nose normal.      Mouth/Throat:      Mouth: Mucous membranes are moist.      Pharynx: Oropharynx is clear. Uvula midline.      Comments: Hoarseness appreciated. Airway patent.  Patient tolerating oral secretions and speaking in full sentences. No drooling. No stridor.   Eyes:      Conjunctiva/sclera: Conjunctivae normal.   Neck:     Cardiovascular:      Rate and Rhythm: Normal rate and regular rhythm.      Pulses: Normal pulses.      Heart sounds: No murmur heard.  Pulmonary:      Effort: Pulmonary effort is normal. No respiratory distress.      Breath sounds: Normal breath sounds.   Abdominal:      Palpations: Abdomen is soft.      Tenderness: There is no abdominal tenderness.   Musculoskeletal:         General: No swelling.      Cervical back: Neck supple.   Skin:     General: Skin is warm and dry.      Capillary Refill: Capillary refill takes less than 2 seconds.   Neurological:      General: No focal deficit present.      Mental Status: She is alert and oriented to person, place, and time.   Psychiatric:         Mood and Affect: Mood normal.         Results Reviewed       Procedure Component Value Units Date/Time    Comprehensive metabolic panel [[id number]]  (Abnormal) Collected: 12/06/24 2118    Lab Status: Final result Specimen: Blood from Arm, Right Updated: 12/06/24 2135     Sodium 138 mmol/L      Potassium 4.0 mmol/L      Chloride 107 mmol/L      CO2 26 mmol/L      ANION GAP 5 mmol/L      BUN 15 mg/dL      Creatinine 0.69 mg/dL      Glucose 114 mg/dL      Calcium 9.0 mg/dL      AST 12 U/L      ALT 9 U/L      Alkaline Phosphatase 79 U/L      Total Protein 6.9 g/dL      Albumin 3.9 g/dL      Total Bilirubin 0.25 mg/dL      eGFR 90 ml/min/1.73sq m     Narrative:      National Kidney Disease  Foundation guidelines for Chronic Kidney Disease (CKD):     Stage 1 with normal or high GFR (GFR > 90 mL/min/1.73 square meters)    Stage 2 Mild CKD (GFR = 60-89 mL/min/1.73 square meters)    Stage 3A Moderate CKD (GFR = 45-59 mL/min/1.73 square meters)    Stage 3B Moderate CKD (GFR = 30-44 mL/min/1.73 square meters)    Stage 4 Severe CKD (GFR = 15-29 mL/min/1.73 square meters)    Stage 5 End Stage CKD (GFR <15 mL/min/1.73 square meters)  Note: GFR calculation is accurate only with a steady state creatinine    CBC and differential [[id number]]  (Abnormal) Collected: 12/06/24 2118    Lab Status: Final result Specimen: Blood from Arm, Right Updated: 12/06/24 2122     WBC 11.34 Thousand/uL      RBC 5.04 Million/uL      Hemoglobin 11.3 g/dL      Hematocrit 35.7 %      MCV 71 fL      MCH 22.4 pg      MCHC 31.7 g/dL      RDW 16.7 %      MPV 10.2 fL      Platelets 357 Thousands/uL      nRBC 0 /100 WBCs      Segmented % 70 %      Immature Grans % 0 %      Lymphocytes % 18 %      Monocytes % 11 %      Eosinophils Relative 1 %      Basophils Relative 0 %      Absolute Neutrophils 7.83 Thousands/µL      Absolute Immature Grans 0.03 Thousand/uL      Absolute Lymphocytes 2.07 Thousands/µL      Absolute Monocytes 1.23 Thousand/µL      Eosinophils Absolute 0.16 Thousand/µL      Basophils Absolute 0.02 Thousands/µL             CT soft tissue neck wo contrast   Final Interpretation by Surya Bashir DO (12/06 2202)      Edema and swelling identified, centered within the right tonsillar and peritonsillar soft tissues extending inferiorly into the hypopharynx involving the aryepiglottic fold. There is severe airway narrowing at the level of the epiglottis and effacement    of the right piriform sinus. These changes extend laterally within the parapharyngeal soft tissues on the right and into the prevertebral space. Findings are most likely infectious/inflammatory in etiology. However, an underlying soft tissue mass cannot     be excluded. This examination is limited by lack of contrast administration. Contrast-enhanced examination is recommended. If patient cannot have iodinated contrast, MRI soft tissue neck with gadolinium recommended.         I personally discussed this study with YOLIE TRIPP on 12/6/2024 10:02 PM.                     Workstation performed: [id number]             Procedures    ED Medication and Procedure Management   None     There are no discharge medications for this patient.    No discharge procedures on file.  ED SEPSIS DOCUMENTATION   Time reflects when diagnosis was documented in both MDM as applicable and the Disposition within this note       Time User Action Codes Description Comment    12/6/2024 10:37 PM Yolie Tripp [J98.8] Compromised airway     12/6/2024 10:38 PM Yolie Tripp [J39.2] Edema of throat     12/6/2024 10:38 PM Yolie Tripp [Z98.890] History of recent dental procedure                  Yolie Tripp PA-C  12/07/24 0445

## 2024-12-07 NOTE — PLAN OF CARE
Problem: PAIN - ADULT  Goal: Verbalizes/displays adequate comfort level or baseline comfort level  Description: Interventions:  - Encourage patient to monitor pain and request assistance  - Assess pain using appropriate pain scale  - Administer analgesics based on type and severity of pain and evaluate response  - Implement non-pharmacological measures as appropriate and evaluate response  - Consider cultural and social influences on pain and pain management  - Notify physician/advanced practitioner if interventions unsuccessful or patient reports new pain  Outcome: Progressing     Problem: INFECTION - ADULT  Goal: Absence or prevention of progression during hospitalization  Description: INTERVENTIONS:  - Assess and monitor for signs and symptoms of infection  - Monitor lab/diagnostic results  - Monitor all insertion sites, i.e. indwelling lines, tubes, and drains  - Monitor endotracheal if appropriate and nasal secretions for changes in amount and color  - [place] appropriate cooling/warming therapies per order  - Administer medications as ordered  - Instruct and encourage patient and family to use good hand hygiene technique  - Identify and instruct in appropriate isolation precautions for identified infection/condition  Outcome: Progressing  Goal: Absence of fever/infection during neutropenic period  Description: INTERVENTIONS:  - Monitor WBC    Outcome: Progressing     Problem: SAFETY ADULT  Goal: Patient will remain free of falls  Description: INTERVENTIONS:  - Educate patient/family on patient safety including physical limitations  - Instruct patient to call for assistance with activity   - Consult OT/PT to assist with strengthening/mobility   - Keep Call bell within reach  - Keep bed low and locked with side rails adjusted as appropriate  - Keep care items and personal belongings within reach  - Initiate and maintain comfort rounds  - Make Fall Risk Sign visible to staff  - Offer Toileting every  Hours,  in advance of need  - Initiate/Maintain alarm  - Obtain necessary fall risk management equipment:   - Apply yellow socks and bracelet for high fall risk patients  - Consider moving patient to room near nurses station  Outcome: Progressing  Goal: Maintain or return to baseline ADL function  Description: INTERVENTIONS:  -  Assess patient's ability to carry out ADLs; assess patient's baseline for ADL function and identify physical deficits which impact ability to perform ADLs (bathing, care of mouth/teeth, toileting, grooming, dressing, etc.)  - Assess/evaluate cause of self-care deficits   - Assess range of motion  - Assess patient's mobility; develop plan if impaired  - Assess patient's need for assistive devices and provide as appropriate  - Encourage maximum independence but intervene and supervise when necessary  - Involve family in performance of ADLs  - Assess for home care needs following discharge   - Consider OT consult to assist with ADL evaluation and planning for discharge  - Provide patient education as appropriate  Outcome: Progressing  Goal: Maintains/Returns to pre admission functional level  Description: INTERVENTIONS:  - Perform AM-PAC 6 Click Basic Mobility/ Daily Activity assessment daily.  - Set and communicate daily mobility goal to care team and patient/family/caregiver.   - Collaborate with rehabilitation services on mobility goals if consulted  - Perform Range of Motion  times a day.  - Reposition patient every  hours.  - Dangle patient  times a day  - Stand patient  times a day  - Ambulate patient  times a day  - Out of bed to chair  times a day   - Out of bed for meals times a day  - Out of bed for toileting  - Record patient progress and toleration of activity level   Outcome: Progressing     Problem: DISCHARGE PLANNING  Goal: Discharge to home or other facility with appropriate resources  Description: INTERVENTIONS:  - Identify barriers to discharge w/patient and caregiver  - Arrange for  needed discharge resources and transportation as appropriate  - Identify discharge learning needs (meds, wound care, etc.)  - Arrange for interpretive services to assist at discharge as needed  - Refer to Case Management Department for coordinating discharge planning if the patient needs post-hospital services based on physician/advanced practitioner order or complex needs related to functional status, cognitive ability, or social support system  Outcome: Progressing     Problem: Knowledge Deficit  Goal: Patient/family/caregiver demonstrates understanding of disease process, treatment plan, medications, and discharge instructions  Description: Complete learning assessment and assess knowledge base.  Interventions:  - Provide teaching at level of understanding  - Provide teaching via preferred learning methods  Outcome: Progressing

## 2024-12-07 NOTE — ASSESSMENT & PLAN NOTE
Likely in the setting of infectious/inflammatory process causing airway swelling   WBC on admission 11.34   Received 1 dose Unasyn at [facility]  Continue abx with zosyn

## 2024-12-07 NOTE — PROGRESS NOTES
Progress Note - Critical Care/ICU   Name: Pina Horton 69 y.o. female I MRN: [id number]  Unit/Bed#: ICU 07 I Date of Admission: 12/7/2024   Date of Service: 12/7/2024 I Hospital Day: 0       Critical Care Interval Transfer Note:    Brief Hospital Summary: Hospital Course: Pina Horton a 69F without significant PMHx who initially presented to [facility] for dental pain and was transferred to [facility] on 12/7 for airway watch in setting of infectious/inflammatory process causing upper airway swelling.  Patient reports she had a cap placed on her tooth on 12/2.  Since that time she has been having swelling, pain, and difficulty swallowing. Labs remarkable for leukocytosis 11.34.  CT soft tissue neck revealed severe airway narrowing with edema and swelling at the level epiglottis, this extended into the parapharyngeal soft tissue and free vertebral space.  Received Decadron 10 mg, Unasyn. Started on medrol 40 gm q 8hrs. Swallowing and breathing improved subjectively per patient. Per ENT evaluation: patent glottis with residual edema of R arytenoid and base of tongue hypertrophy. Recommending IV antibiotics for another 24 hrs, narrowed to Unasyn., and completion of 3x doses of steroids. Can D/c tomorrow  (12/8) if continues to do well.     Barriers to discharge:   24 hrs IV antibiotics  3x doses steroids     Consults: IP CONSULT TO CASE MANAGEMENT  IP CONSULT TO ENT    Recommended to review admission imaging for incidental findings and document in discharge navigator: Chart reviewed, no known incidental findings noted at this time.      Discharge Plan: Anticipate discharge tomorrow to home.       Patient seen and evaluated by Critical Care today and deemed to be appropriate for transfer to Med Surg. Spoke to Dr. Griffith from [facility] to accept transfer. Critical care can be contacted via SecureChat with any questions or concerns. Please use the Critical Care AP Role in Secure Chat for any provider inquires until the patient is  transferred out of the ICU or until tomorrow at 0600.

## 2024-12-07 NOTE — CONSULTS
OTOLARYNGOLOGY CONSULT    Date of Service: 12/7/2024      ASSESSMENT/PLAN:  Pina Horton is a 69 y.o. female who we are consulted on for airway watch in the setting of infection resulting in airway edema with glottic involvement    -Npl with patent glottis, some residual edema of right arytenoid, some base of tongue hypertrophy  -Recommend ongoing IV antibiotics for a period of 24 hours, recommend narrowing from Zosyn to either Unasyn or clindamycin  -Okay to DC to floor  -Complete total of 3 doses of steroids, after which stop monitor for rebound  -Can plan for DC morning of 12/8 if doing well    Please contact ENT Resident Parker Gilliam Role for any questions or concerns.    HPI  69-year-old female who is in the area on vacation who presented due to progressive difficulty swallowing and neck swelling after a cap was placed at her dentist in [place] earlier this week.  She was also having difficulty tolerating secretions prior to presentation.  She was seen in [place] and had a CT scan revealing airway edema, there were 4 was transferred to [place] for airway watch.  She reports that since starting antibiotics and steroid she feels much better today, she reports an improvement in her neck swelling and her sore throat.  She reports she is no longer drooling or having difficulty tolerating secretions    CURRENT HOSPITAL MEDICATIONS  Current Facility-Administered Medications   Medication Dose Route Frequency Provider Last Rate Last Admin    ampicillin-sulbactam (UNASYN) 3 g in sodium chloride 0.9 % 100 mL IVPB  3 g Intravenous Q6H Tito Lane, DO        chlorhexidine (PERIDEX) 0.12 % oral rinse 15 mL  15 mL Mouth/Throat Q12H NAGA Hanna PA-C   15 mL at 12/07/24 0822    dexamethasone (DECADRON) injection 2 mg  2 mg Intravenous Q12H [facility] Tito Lane, DO        diphenhydrAMINE (BENADRYL) injection 25 mg  25 mg Intravenous Q6H PRN Tito Lane, DO        enoxaparin (LOVENOX)  subcutaneous injection 40 mg  40 mg Subcutaneous Daily Bentley Hanna PA-C   40 mg at 12/07/24 0822    Famotidine (PF) (PEPCID) injection 20 mg  20 mg Intravenous Q12H [facility] Tito Lane,         insulin lispro (HumALOG/ADMELOG) 100 units/mL subcutaneous injection 1-6 Units  1-6 Units Subcutaneous TID  Tito Lane DO           REVIEW OF SYSTEMS  As above    HISTORIES  PMH:  No past medical history on file.    PSH:  No past surgical history on file.    SocHx:  Social History     Tobacco Use    Smoking status: Every Day     Types: Cigarettes    Smokeless tobacco: Never   Substance Use Topics    Alcohol use: Yes     Comment: social    Drug use: Never       FH:  No family history on file.    ALLERGIES:  Allergies   Allergen Reactions    Iodine - Food Allergy Anaphylaxis       PHYSICAL EXAM  Visit Vitals  /89 (BP Location: Left arm)   Pulse 86   Temp 98.5 °F (36.9 °C) (Oral)   Resp (!) 28   SpO2 96%   OB Status Postmenopausal   Smoking Status Every Day       General: NAD, AOx4  Eyes:  EOMI, PERRL  Ears:  External ears normal in appearance  Nose:  External appearance normal, no septal deviation  Oral cavity:  No trismus, no mass/lesions, difficult to visualize oropharynx due to gag and tongue size however midline palate and uvula were able to be visualized without any evidence of asymmetry or effacement  Neck: Trachea is midline; mild tenderness to palpation of right neck with mild lymphadenopathy  Skin:  No obvious facial lesions  Neuro: Motor and sensory grossly intact. Face symmetrical, no obvious cranial nerve palsies,motor and sensory grossly intact, no focal deficits.   Lungs:  Normal work of breathing, symmetrical chest expansion  Vascular: Well perfused      LABORATORY  Reviewed    PROCEDURES  PROCEDURE: Flexible Laryngoscopy  Indication:  airway edema  Verbal consent obtained  Surgeon: Kim Otto MD  Anesthesia: 2% lidocaine, oxymetazoline  Scope passed through nasal  cavity  Nasopharynx: unremarkable  Oropharynx: unremarkable  Hypopharynx/Larynx:   Vocal fold mobility = wnl   Laryngeal edema  = R>L watery edema of R arytenoid    Laryngeal erythema = mod   Vocal folds = wnl   Other findings = patent glottis, lingual tonsillar hypertrophy   Scope was removed  Patient tolerated procedure well without complications      RADIOLOGY  CT Neck   IMPRESSION:     Edema and swelling identified, centered within the right tonsillar and peritonsillar soft tissues extending inferiorly into the hypopharynx involving the aryepiglottic fold. There is severe airway narrowing at the level of the epiglottis and effacement   of the right piriform sinus. These changes extend laterally within the parapharyngeal soft tissues on the right and into the prevertebral space. Findings are most likely infectious/inflammatory in etiology. However, an underlying soft tissue mass cannot   be excluded. This examination is limited by lack of contrast administration. Contrast-enhanced examination is recommended. If patient cannot have iodinated contrast, MRI soft tissue neck with gadolinium recommended.    Patient Active Problem List    Diagnosis Date Noted    HTN (hypertension) 12/07/2024    Narrowing of airway 12/06/2024    Leukocytosis 12/06/2024       Kim Otto MD  Otolaryngology - Head and Neck Surgery PGY-4  Please contact ENT Resident EPIC Chat Role for any questions or concerns.

## 2024-12-07 NOTE — H&P
H&P - Critical Care/ICU   Name: Pina Adams y.o. female I MRN: [id number]  Unit/Bed#: ICU 07 I Date of Admission: 12/7/2024   Date of Service: 12/7/2024 I Hospital Day: 0       Assessment & Plan  Narrowing of airway  Suspected secondary to infectious or inflammatory process due to recent dental procedure  Initially presented to [facility] and transferred to [facility] for airway concern   Had cap placed on tooth on Monday, has had trouble swallowing and swelling since  12/6 CT soft tissue neck: Edema and swelling within the right tonsillar and peritonsillar soft tissues extending inferiorly into the hypopharynx involving the aryepiglottic fold. Severe airway narrowing at the epiglottis and effacement of the right piriform sinus. Changes extend laterally within the parapharyngeal soft tissues on the right and into the prevertebral space.   Received decadron 10mg at [facility]    Plan   ENT consult   Continue solumedrol 40mg q8hrs  Continue abx with zosyn  Close airway monitoring   Leukocytosis  Likely in the setting of infectious/inflammatory process causing airway swelling   WBC on admission 11.34   Received 1 dose Unasyn at [facility]  Continue abx with zosyn  HTN (hypertension)  Not on any home antihypertensives  Hydralazine 10mg x1   Disposition: Stepdown Level 1    History of Present Illness   Pina Horton 69F no significant medical hx who initially presented to [facility] for dental pain and was transferred to [facility] on 12/7 for airway watch in setting of infectious/inflammatory process causing upper airway swelling.     Patient reports she had a cap placed on her tooth on Monday. Since that time she has been having swelling, pain, and difficulty swallowing which progressed on Thursday. She was self treating with tylenol and ibuprofen. States she had difficulty breathing, managing secretions prior to arrival, requiring her to sit upright in tripod positioning. Her voice is noticeably different from normal.     [facility] ED course: Labs  remarkable for leukocytosis 11.34. CT soft tissue neck revealed severe airway narrowing with edema and swelling at the level epiglottis, this extended into the parapharyngeal soft tissue and free vertebral space. Received Decadron 10 mg and one dose Unasyn.     History obtained from chart review and the patient.  Review of Systems: Review of Systems   HENT:  Positive for dental problem, trouble swallowing and voice change.    Cardiovascular: Negative.    Gastrointestinal: Negative.    Neurological: Negative.    Psychiatric/Behavioral: Negative.         Historical Information   No past medical history on file. No past surgical history on file.   No current outpatient medications Allergies   Allergen Reactions    Iodine - Food Allergy Anaphylaxis      Social History     Tobacco Use    Smoking status: Every Day     Types: Cigarettes    Smokeless tobacco: Never   Substance Use Topics    Alcohol use: Yes     Comment: social    Drug use: Never    No family history on file.       Objective :                   Vitals I/O      Most Recent Min/Max in 24hrs   Temp   Temp  Min: 99.3 °F (37.4 °C)  Max: 99.3 °F (37.4 °C)   Pulse   Pulse  Min: 77  Max: 95   Resp   Resp  Min: 18  Max: 20   BP   BP  Min: 165/80  Max: 175/102   O2 Sat   SpO2  Min: 95 %  Max: 98 %    No intake or output data in the 24 hours ending 12/07/24 0144    Diet NPO    Invasive Monitoring           Physical Exam   Physical Exam  Skin:     General: Skin is warm and dry.   HENT:      Head: Normocephalic and atraumatic.      Mouth/Throat:      Mouth: Mucous membranes are moist.      Comments: Mallampati 3, mild swelling of soft palate, no apparent erythema, equal elevation of soft palate. Cap on upper R second molar (tooth #2)  Neck:      Comments: Mild swelling of R neck and under chin, nontender to palpitation  Cardiovascular:      Rate and Rhythm: Normal rate and regular rhythm.      Pulses: Normal pulses.      Heart sounds: Normal heart sounds.    Musculoskeletal:         General: Normal range of motion.      Right lower leg: No edema.      Left lower leg: No edema.   Abdominal: General: There is no distension.      Palpations: Abdomen is soft.      Tenderness: There is no abdominal tenderness.   Constitutional:       Appearance: She is well-developed and well-nourished.   Pulmonary:      Effort: Pulmonary effort is normal.      Breath sounds: Normal breath sounds.   Neurological:      General: No focal deficit present.      Mental Status: She is alert and oriented to person, place and time.      Motor: gross motor function is at baseline for patient. Strength full and intact in all extremities.          Diagnostic Studies        Lab Results: I have reviewed the following results:     Medications:  Scheduled PRN   chlorhexidine, 15 mL, Q12H NAGA  enoxaparin, 40 mg, Daily  hydrALAZINE, 10 mg, Once  methylPREDNISolone sodium succinate, 40 mg, Q8H NAGA  piperacillin-tazobactam, 4.5 g, Once   Followed by  piperacillin-tazobactam, 4.5 g, Q8H          Continuous          Labs:   CBC    Recent Labs     12/06/24 2118   WBC 11.34*   HGB 11.3*   HCT 35.7        BMP    Recent Labs     12/06/24 2118   SODIUM 138   K 4.0      CO2 26   AGAP 5   BUN 15   CREATININE 0.69   CALCIUM 9.0       Coags    No recent results     Additional Electrolytes  No recent results       Blood Gas    No recent results  No recent results LFTs  Recent Labs     12/06/24 2118   ALT 9   AST 12*   ALKPHOS 79   ALB 3.9   TBILI 0.25       Infectious  No recent results  Glucose  Recent Labs     12/06/24 2118   GLUC 114

## 2024-12-07 NOTE — EMTALA/ACUTE CARE TRANSFER
[facility] EMERGENCY DEPARTMENT  [address]  [place] [address]  Dept: [phone]      EMTALA TRANSFER CONSENT    NAME Pina RENDON 1955                              MRN [id number]    I have been informed of my rights regarding examination, treatment, and transfer   by Dr. Douglas Bill MD    Benefits: Specialized equipment and/or services available at the receiving facility (Include comment)________________________, Continuity of care, Patient preference (ENT)    Risks: Potential for delay in receiving treatment, Potential deterioration of medical condition, Loss of IV, Increased discomfort during transfer, Possible worsening of condition or death during transfer      Transfer Request   I acknowledge that my medical condition has been evaluated and explained to me by the emergency department physician or other qualified medical person and/or my attending physician who has recommended and offered to me further medical examination and treatment. I understand the Hospital's obligation with respect to the treatment and stabilization of my emergency medical condition. I nevertheless request to be transferred. I release the Hospital, the doctor, and any other persons caring for me from all responsibility or liability for any injury or ill effects that may result from my transfer and agree to accept all responsibility for the consequences of my choice to transfer, rather than receive stabilizing treatment at the Hospital. I understand that because the transfer is my request, my insurance may not provide reimbursement for the services.  The Hospital will assist and direct me and my family in how to make arrangements for transfer, but the hospital is not liable for any fees charged by the transport service.  In spite of this understanding, I refuse to consent to further medical examination and treatment which has been offered to me, and  request transfer to Accepting Facility Name, City & State : [facility]. I authorize the performance of emergency medical procedures and treatments upon me in both transit and upon arrival at the receiving facility.  Additionally, I authorize the release of any and all medical records to the receiving facility and request they be transported with me, if possible.    I authorize the performance of emergency medical procedures and treatments upon me in both transit and upon arrival at the receiving facility.  Additionally, I authorize the release of any and all medical records to the receiving facility and request they be transported with me, if possible.  I understand that the safest mode of transportation during a medical emergency is an ambulance and that the Hospital advocates the use of this mode of transport. Risks of traveling to the receiving facility by car, including absence of medical control, life sustaining equipment, such as oxygen, and medical personnel has been explained to me and I fully understand them.    (TOMASA CORRECT BOX BELOW)  [ X ]  I consent to the stated transfer and to be transported by ambulance/helicopter.  [  ]  I consent to the stated transfer, but refuse transportation by ambulance and accept full responsibility for my transportation by car.  I understand the risks of non-ambulance transfers and I exonerate the Hospital and its staff from any deterioration in my condition that results from this refusal.    X___________________________________________    DATE  24  TIME________  Signature of patient or legally responsible individual signing on patient behalf           RELATIONSHIP TO PATIENT_________________________          Provider Certification    NAME Pina Horton                                         1955                              MRN [id number]    A medical screening exam was performed on the above named patient.  Based on the examination:    Condition Necessitating  Transfer The primary encounter diagnosis was Compromised airway. Diagnoses of Edema of throat and History of recent dental procedure were also pertinent to this visit.    Patient Condition: The patient has been stabilized such that within reasonable medical probability, no material deterioration of the patient condition or the condition of the unborn child(lisandra) is likely to result from the transfer    Reason for Transfer: Level of Care needed not available at this facility, Patient/Family request, No bed available at level of patient's needs    Transfer Requirements: Facility SLA   Space available and qualified personnel available for treatment as acknowledged by    Agreed to accept transfer and to provide appropriate medical treatment as acknowledged by       [place]  Appropriate medical records of the examination and treatment of the patient are provided at the time of transfer   STAFF INITIAL WHEN COMPLETED _______  Transfer will be performed by qualified personnel from    and appropriate transfer equipment as required, including the use of necessary and appropriate life support measures.    Provider Certification: I have examined the patient and explained the following risks and benefits of being transferred/refusing transfer to the patient/family:  General risk, such as traffic hazards, adverse weather conditions, rough terrain or turbulence, possible failure of equipment (including vehicle or aircraft), or consequences of actions of persons outside the control of the transport personnel, Unanticipated needs of medical equipment and personnel during transport, Risk of worsening condition, The possibility of a transport vehicle being unavailable      Based on these reasonable risks and benefits to the patient and/or the unborn child(lisandra), and based upon the information available at the time of the patient’s examination, I certify that the medical benefits reasonably to be expected from the provision of  appropriate medical treatments at another medical facility outweigh the increasing risks, if any, to the individual’s medical condition, and in the case of labor to the unborn child, from effecting the transfer.    X____________________________________________ DATE 12/06/24        TIME_______      ORIGINAL - SEND TO MEDICAL RECORDS   COPY - SEND WITH PATIENT DURING TRANSFER

## 2024-12-07 NOTE — ED NOTES
Transport to [facility]  ICU 07.   Report to be call to [phone]  Transport [id number]                Selina Jimenez RN  12/06/24 4029

## 2024-12-07 NOTE — ED NOTES
Patient changed into hospital gown with all clothing removed except under wear. Vital signs documented. Patient states feeling significant improvement since start of treatment in ED. IV remains in place with IVF & abx infusing. Patient given blankets & pillows & has no complaints at this time.      Yeni Moreno RN  12/06/24 9868

## 2024-12-07 NOTE — HOSPITAL COURSE
Pina Horton a 69F without significant PMHx who initially presented to [facility] for dental pain and was transferred to [facility] on 12/7 for airway watch in setting of infectious/inflammatory process causing upper airway swelling.  Patient reports she had a cap placed on her tooth on 12/2.  Since that time she has been having swelling, pain, and difficulty swallowing. Labs remarkable for leukocytosis 11.34.  CT soft tissue neck revealed severe airway narrowing with edema and swelling at the level epiglottis, this extended into the parapharyngeal soft tissue and free vertebral space.  Received Decadron 10 mg, Unasyn. Started on medrol 40 gm q 8hrs. Swallowing and breathing improved subjectively per patient. Per ENT evaluation: patent glottis with residual edema of R arytenoid and base of tongue hypertrophy. Recommending IV antibiotics for another 24 hrs, narrowed to Unasyn., and completion of 3x doses of steroids. Can D/c tomorrow  (12/8) if continues to do well.

## 2024-12-07 NOTE — ASSESSMENT & PLAN NOTE
Suspected secondary to infectious or inflammatory process due to recent dental procedure  Initially presented to [facility] and transferred to [facility] for airway concern   Had cap placed on tooth on Monday, has had trouble swallowing and swelling since  12/6 CT soft tissue neck: Edema and swelling within the right tonsillar and peritonsillar soft tissues extending inferiorly into the hypopharynx involving the aryepiglottic fold. Severe airway narrowing at the epiglottis and effacement of the right piriform sinus. Changes extend laterally within the parapharyngeal soft tissues on the right and into the prevertebral space.   Received decadron 10mg at [facility]    Plan   ENT consult   Continue solumedrol 40mg q8hrs  Continue abx with zosyn  Close airway monitoring

## 2024-12-08 VITALS
RESPIRATION RATE: 16 BRPM | WEIGHT: 193.56 LBS | HEART RATE: 69 BPM | DIASTOLIC BLOOD PRESSURE: 98 MMHG | OXYGEN SATURATION: 93 % | BODY MASS INDEX: 32.21 KG/M2 | TEMPERATURE: 98.7 F | SYSTOLIC BLOOD PRESSURE: 152 MMHG

## 2024-12-08 LAB
ANION GAP SERPL CALCULATED.3IONS-SCNC: 6 MMOL/L (ref 4–13)
BASOPHILS # BLD AUTO: 0.01 THOUSANDS/ÂΜL (ref 0–0.1)
BASOPHILS NFR BLD AUTO: 0 % (ref 0–1)
BUN SERPL-MCNC: 14 MG/DL (ref 5–25)
CALCIUM SERPL-MCNC: 8.6 MG/DL (ref 8.4–10.2)
CHLORIDE SERPL-SCNC: 109 MMOL/L (ref 96–108)
CO2 SERPL-SCNC: 25 MMOL/L (ref 21–32)
CREAT SERPL-MCNC: 0.57 MG/DL (ref 0.6–1.3)
EOSINOPHIL # BLD AUTO: 0 THOUSAND/ÂΜL (ref 0–0.61)
EOSINOPHIL NFR BLD AUTO: 0 % (ref 0–6)
ERYTHROCYTE [DISTWIDTH] IN BLOOD BY AUTOMATED COUNT: 16.6 % (ref 11.6–15.1)
GFR SERPL CREATININE-BSD FRML MDRD: 96 ML/MIN/1.73SQ M
GLUCOSE SERPL-MCNC: 134 MG/DL (ref 65–140)
GLUCOSE SERPL-MCNC: 143 MG/DL (ref 65–140)
HCT VFR BLD AUTO: 32.5 % (ref 34.8–46.1)
HGB BLD-MCNC: 11 G/DL (ref 11.5–15.4)
IMM GRANULOCYTES # BLD AUTO: 0.06 THOUSAND/UL (ref 0–0.2)
IMM GRANULOCYTES NFR BLD AUTO: 1 % (ref 0–2)
LYMPHOCYTES # BLD AUTO: 1.37 THOUSANDS/ÂΜL (ref 0.6–4.47)
LYMPHOCYTES NFR BLD AUTO: 14 % (ref 14–44)
MAGNESIUM SERPL-MCNC: 2 MG/DL (ref 1.9–2.7)
MCH RBC QN AUTO: 23.6 PG (ref 26.8–34.3)
MCHC RBC AUTO-ENTMCNC: 33.8 G/DL (ref 31.4–37.4)
MCV RBC AUTO: 70 FL (ref 82–98)
MONOCYTES # BLD AUTO: 0.58 THOUSAND/ÂΜL (ref 0.17–1.22)
MONOCYTES NFR BLD AUTO: 6 % (ref 4–12)
NEUTROPHILS # BLD AUTO: 8.07 THOUSANDS/ÂΜL (ref 1.85–7.62)
NEUTS SEG NFR BLD AUTO: 79 % (ref 43–75)
NRBC BLD AUTO-RTO: 0 /100 WBCS
PLATELET # BLD AUTO: 358 THOUSANDS/UL (ref 149–390)
PMV BLD AUTO: 10.7 FL (ref 8.9–12.7)
POTASSIUM SERPL-SCNC: 4 MMOL/L (ref 3.5–5.3)
RBC # BLD AUTO: 4.66 MILLION/UL (ref 3.81–5.12)
SODIUM SERPL-SCNC: 140 MMOL/L (ref 135–147)
WBC # BLD AUTO: 10.09 THOUSAND/UL (ref 4.31–10.16)

## 2024-12-08 PROCEDURE — 80048 BASIC METABOLIC PNL TOTAL CA: CPT

## 2024-12-08 PROCEDURE — 85025 COMPLETE CBC W/AUTO DIFF WBC: CPT

## 2024-12-08 PROCEDURE — 83735 ASSAY OF MAGNESIUM: CPT

## 2024-12-08 PROCEDURE — 82948 REAGENT STRIP/BLOOD GLUCOSE: CPT

## 2024-12-08 RX ADMIN — AMPICILLIN SODIUM AND SULBACTAM SODIUM 3 G: 100; 50 INJECTION, POWDER, FOR SOLUTION INTRAVENOUS at 05:59

## 2024-12-08 NOTE — CASE MANAGEMENT
Case Management Progress Note    Patient name Pina Horton  Location S /S -01 MRN [id number]  : 1955 Date 2024       LOS (days): 1  Geometric Mean LOS (GMLOS) (days):   Days to GMLOS:        OBJECTIVE:        Current admission status: Inpatient  Preferred Pharmacy:   \[facility] - [place] - [address]  [address] [place] [address]  Phone: [phone] Fax: [phone]    [id number] - [place] - [address]  [address]  [place] [address]  Phone: [phone] Fax: [phone]    Primary Care Provider: No primary care provider on file.    Primary Insurance: MEDICARE  Secondary Insurance:     PROGRESS NOTE:    Lyft waiver provided to patient for safe transport when ready for d/c. Signed Lyft waiver placed in scan bin for pt chart.

## 2024-12-08 NOTE — PROGRESS NOTES
"OTOLARYNGOLOGY PROGRESS NOTE    Date of Service: 12/8/2024 9:58 AM    HPI  Patient is a 69 y.o. female with oropharyngeal infection extending into glottis, visiting from out of town transferred from [place] for airway watch.     Overnight Events: doing well s/p steroids, abx. No difficulty breathing. No difficulty swallowing. Feels almost back to normal. Would like to go home. Last dose steroids was 11pm last night      PHYSICAL EXAM:  Vitals:    12/08/24 0735   BP: 152/98   Pulse: 69   Resp:    Temp: 98.7 °F (37.1 °C)   SpO2: 93%        General: No acute distress, AOx4  HEENT: superior portion of oropharynx and palate visualized without any swelling. Difficult to visualize entire oropharynx due to tongue size and gag3  Neurology: No focal deficits  Lungs: Breathing easy, unlabored and even on room air  Cardio: RRR, well perfused  Abd: soft, NT, ND     No intake or output data in the 24 hours ending 12/08/24 0958      LABORATORY    Recent Labs     12/06/24 2118 12/07/24  0453 12/08/24  0533   WBC 11.34* 10.18* 10.09   HGB 11.3* 11.5 11.0*   HCT 35.7 35.1 32.5*    362 358       Recent Labs     12/06/24 2118 12/07/24  0453 12/08/24  0533   K 4.0 4.1 4.0    109* 109*   BUN 15 13 14   MG  --  1.9 2.0       Invalid input(s): \"PTPAT\", \"PTINR\", \"APTTMNNM\", \"APTTPAT\"      Patient Active Problem List   Diagnosis    Narrowing of airway    Leukocytosis    HTN (hypertension)         ASSESSMENT  Patient is a 69 y.o. female w/ acute and chronic problems as above, who presents with oropharyngeal edema extending into supraglottis. NPL yesterday with patent airway. Continuing to improve. Now s/p over 24h IV abx. Doing well without any dysphagia, odynophagia, or difficulty breathing.    PLAN  - ok to dc from ENT standpoint   - complete 10 day course of abx  - follow up in [place] where she is from with ENT  - if any worsening before return home, return to     Kim Otto MD  Otolaryngology - Head and Neck Surgery " PGY-4  Please contact ENT Resident EPIC Chat Role for any questions or concerns.

## 2024-12-08 NOTE — PLAN OF CARE
Problem: PAIN - ADULT  Goal: Verbalizes/displays adequate comfort level or baseline comfort level  Description: Interventions:  - Encourage patient to monitor pain and request assistance  - Assess pain using appropriate pain scale  - Administer analgesics based on type and severity of pain and evaluate response  - Implement non-pharmacological measures as appropriate and evaluate response  - Consider cultural and social influences on pain and pain management  - Notify physician/advanced practitioner if interventions unsuccessful or patient reports new pain  Outcome: Progressing     Problem: INFECTION - ADULT  Goal: Absence or prevention of progression during hospitalization  Description: INTERVENTIONS:  - Assess and monitor for signs and symptoms of infection  - Monitor lab/diagnostic results  - Monitor all insertion sites, i.e. indwelling lines, tubes, and drains  - Monitor endotracheal if appropriate and nasal secretions for changes in amount and color  - [place] appropriate cooling/warming therapies per order  - Administer medications as ordered  - Instruct and encourage patient and family to use good hand hygiene technique  - Identify and instruct in appropriate isolation precautions for identified infection/condition  Outcome: Progressing  Goal: Absence of fever/infection during neutropenic period  Description: INTERVENTIONS:  - Monitor WBC    Outcome: Progressing     Problem: DISCHARGE PLANNING  Goal: Discharge to home or other facility with appropriate resources  Description: INTERVENTIONS:  - Identify barriers to discharge w/patient and caregiver  - Arrange for needed discharge resources and transportation as appropriate  - Identify discharge learning needs (meds, wound care, etc.)  - Arrange for interpretive services to assist at discharge as needed  - Refer to Case Management Department for coordinating discharge planning if the patient needs post-hospital services based on physician/advanced  practitioner order or complex needs related to functional status, cognitive ability, or social support system  Outcome: Progressing

## 2024-12-08 NOTE — DISCHARGE INSTR - AVS FIRST PAGE
Dear Pina Horton,     It was our pleasure to care for you here at [facility].  It is our hope that we were always able to exceed the expected standards for your care during your stay.  You were hospitalized due to oropharyngeal infection extending into the glottic area.  You were cared for on the third floor under the service of Leisa Tejada MD with the [facility] who covers for your primary care physician (PCP), No primary care provider on file., while you were hospitalized.  If you have any questions or concerns related to this hospitalization, you may contact us at .  For follow up as well as medication refills, we recommend that you follow up with your primary care physician.  A registered nurse will reach out to you by phone within a few days after your discharge to answer any additional questions that you may have after going home.  However, at this time we provide for you here, the most important instructions / recommendations at discharge:     Notable Medication Adjustments -   We discharged you on Augmentin which is an antibiotic to be taken twice a day for the next 9 days this prescription was sent to the pharmacy in our hospital.  You have any difficulty picking up this prescription you need to come back into the emergency department or call the hospital  Testing Required after Discharge -   We will need a complete blood count, basic metabolic panel, vitals checked by your primary care doctor as soon as you get back to [place].  Will also need a referral to a ear nose and throat doctor so that they can directly take a look at your oropharynx and throat/glottis.  If you do not follow-up as advised this could lead to a missed infection/missed worsening of your symptoms, missed recurrence of the original problem and serious medical issues including death  ** Please contact your PCP to request testing orders for any of the testing  recommended here **  Important follow up information -   You will need to follow-up with your primary care doctor as outlined above and also see an ENT doctor.  If you have any worsening symptoms or change in symptoms such as tingling in your throat, tongue swelling, throat swelling, fevers, chills, difficulty clearing your secretions you must come back into the emergency department immediately.  You should not wait till you see your primary care doctor in this event.  This could signify worsening swelling/worsening of your infection and could if left untreated lead to you not being able to breathe and in the worst case scenario death  I do also note that your hemoglobin is low, for a woman your age this needs to be followed up with your primary care doctor make sure that you are up-to-date with colonoscopies, GYN cancer screening breast cancer screening.  Anemia could signify an underlying illness such as colon cancer breast cancer uterine cancer with occult blood loss.  Again you should follow-up with your primary care doctor with regard to this  Other Instructions -   Please take the antibiotics as prescribed.  Please note that we had you on steroids but that the ear nose and throat doctors and OMS surgeons did not recommend that you receive any further doses of steroids upon discharge.  They recommended 10 days of antibiotics only  Please review this entire after visit summary as additional general instructions including medication list, appointments, activity, diet, any pertinent wound care, and other additional recommendations from your care team that may be provided for you.      Sincerely,     Leisa Tejada MD

## (undated) DEVICE — ERBE NESSY® OMEGA PLATE USA (85+23)CM² , WITH CABLE 3 M: Brand: ERBE

## (undated) DEVICE — POLYP TRAP: Brand: TRAPEASE®

## (undated) DEVICE — SNARE ENDOSCP L240CM LOOP W13MM DIA2.4MM SHT THROW SM OVL

## (undated) DEVICE — DEFENDO AIR WATER SUCTION AND BIOPSY VALVE KIT FOR  OLYMPUS: Brand: DEFENDO AIR/WATER/SUCTION AND BIOPSY VALVE

## (undated) DEVICE — GLOVE ORANGE PI 7   MSG9070

## (undated) DEVICE — ENDO KIT W/SYRINGE: Brand: MEDLINE INDUSTRIES, INC.